# Patient Record
Sex: MALE | Race: WHITE | NOT HISPANIC OR LATINO | Employment: UNEMPLOYED | URBAN - METROPOLITAN AREA
[De-identification: names, ages, dates, MRNs, and addresses within clinical notes are randomized per-mention and may not be internally consistent; named-entity substitution may affect disease eponyms.]

---

## 2017-05-02 ENCOUNTER — HISTORICAL (OUTPATIENT)
Dept: RADIOLOGY | Facility: HOSPITAL | Age: 57
End: 2017-05-02

## 2017-05-23 ENCOUNTER — HISTORICAL (OUTPATIENT)
Dept: ORTHOPEDICS | Facility: CLINIC | Age: 57
End: 2017-05-23

## 2017-09-26 ENCOUNTER — HISTORICAL (OUTPATIENT)
Dept: LAB | Facility: HOSPITAL | Age: 57
End: 2017-09-26

## 2017-09-26 LAB
CHOLEST SERPL-MCNC: 240 MG/DL (ref 50–200)
CHOLEST/HDLC SERPL: 7 {RATIO} (ref 0–5)
HDLC SERPL-MCNC: 36 MG/DL (ref 35–60)
LDLC SERPL CALC-MCNC: 157 MG/DL (ref 50–140)
PSA SERPL-MCNC: 1.15 NG/ML (ref 0–4)
TRIGL SERPL-MCNC: 236 MG/DL (ref 30–150)
VLDLC SERPL CALC-MCNC: 47 MG/DL

## 2018-03-15 ENCOUNTER — HISTORICAL (OUTPATIENT)
Dept: ADMINISTRATIVE | Facility: HOSPITAL | Age: 58
End: 2018-03-15

## 2018-03-15 LAB
ABS NEUT (OLG): 4.27 X10(3)/MCL (ref 2.1–9.2)
ALBUMIN SERPL-MCNC: 4.1 GM/DL (ref 3.4–5)
ALBUMIN/GLOB SERPL: 1 RATIO (ref 1–2)
ALP SERPL-CCNC: 47 UNIT/L (ref 45–117)
ALT SERPL-CCNC: 42 UNIT/L (ref 12–78)
AST SERPL-CCNC: 18 UNIT/L (ref 15–37)
BASOPHILS # BLD AUTO: 0.04 X10(3)/MCL
BASOPHILS NFR BLD AUTO: 0 %
BILIRUB SERPL-MCNC: 0.4 MG/DL (ref 0.2–1)
BILIRUBIN DIRECT+TOT PNL SERPL-MCNC: 0.1 MG/DL
BILIRUBIN DIRECT+TOT PNL SERPL-MCNC: 0.3 MG/DL
BUN SERPL-MCNC: 15 MG/DL (ref 7–18)
CALCIUM SERPL-MCNC: 8.9 MG/DL (ref 8.5–10.1)
CHLORIDE SERPL-SCNC: 104 MMOL/L (ref 98–107)
CO2 SERPL-SCNC: 28 MMOL/L (ref 21–32)
CREAT SERPL-MCNC: 1 MG/DL (ref 0.6–1.3)
EOSINOPHIL # BLD AUTO: 0.15 X10(3)/MCL
EOSINOPHIL NFR BLD AUTO: 2 %
ERYTHROCYTE [DISTWIDTH] IN BLOOD BY AUTOMATED COUNT: 13.8 % (ref 11.5–14.5)
GLOBULIN SER-MCNC: 4.1 GM/ML (ref 2.3–3.5)
GLUCOSE SERPL-MCNC: 115 MG/DL (ref 74–106)
HCT VFR BLD AUTO: 49.6 % (ref 40–51)
HGB BLD-MCNC: 16 GM/DL (ref 13.5–17.5)
IMM GRANULOCYTES # BLD AUTO: 0.04 10*3/UL
IMM GRANULOCYTES NFR BLD AUTO: 0 %
LYMPHOCYTES # BLD AUTO: 2.62 X10(3)/MCL
LYMPHOCYTES NFR BLD AUTO: 33 % (ref 13–40)
MCH RBC QN AUTO: 29.3 PG (ref 26–34)
MCHC RBC AUTO-ENTMCNC: 32.3 GM/DL (ref 31–37)
MCV RBC AUTO: 90.7 FL (ref 80–100)
MONOCYTES # BLD AUTO: 0.74 X10(3)/MCL
MONOCYTES NFR BLD AUTO: 9 % (ref 4–12)
NEUTROPHILS # BLD AUTO: 4.27 X10(3)/MCL
NEUTROPHILS NFR BLD AUTO: 54 X10(3)/MCL
PLATELET # BLD AUTO: 288 X10(3)/MCL (ref 130–400)
PMV BLD AUTO: 9.7 FL (ref 7.4–10.4)
POTASSIUM SERPL-SCNC: 4.7 MMOL/L (ref 3.5–5.1)
PROT SERPL-MCNC: 8.2 GM/DL (ref 6.4–8.2)
RBC # BLD AUTO: 5.47 X10(6)/MCL (ref 4.5–5.9)
SODIUM SERPL-SCNC: 138 MMOL/L (ref 136–145)
WBC # SPEC AUTO: 7.9 X10(3)/MCL (ref 4.5–11)

## 2018-03-22 ENCOUNTER — HISTORICAL (OUTPATIENT)
Dept: SURGERY | Facility: HOSPITAL | Age: 58
End: 2018-03-22

## 2018-03-30 ENCOUNTER — HISTORICAL (OUTPATIENT)
Dept: LAB | Facility: HOSPITAL | Age: 58
End: 2018-03-30

## 2018-03-30 LAB
ALBUMIN SERPL-MCNC: 3.8 GM/DL (ref 3.4–5)
ALBUMIN/GLOB SERPL: 0.8 RATIO (ref 1.1–2)
ALP SERPL-CCNC: 46 UNIT/L (ref 46–116)
ALT SERPL-CCNC: 41 UNIT/L (ref 12–78)
AST SERPL-CCNC: 20 UNIT/L (ref 10–37)
BILIRUB SERPL-MCNC: 0.4 MG/DL (ref 0.2–1)
BILIRUBIN DIRECT+TOT PNL SERPL-MCNC: 0.1 MG/DL (ref 0–0.2)
BILIRUBIN DIRECT+TOT PNL SERPL-MCNC: 0.3 MG/DL
BUN SERPL-MCNC: 13 MG/DL (ref 7–18)
CALCIUM SERPL-MCNC: 9.2 MG/DL (ref 8.5–10.1)
CHLORIDE SERPL-SCNC: 102 MMOL/L (ref 98–107)
CHOLEST SERPL-MCNC: 201 MG/DL (ref 50–200)
CHOLEST/HDLC SERPL: 5 {RATIO} (ref 0–5)
CO2 SERPL-SCNC: 26.7 MMOL/L (ref 21–32)
CREAT SERPL-MCNC: 1.02 MG/DL (ref 0.7–1.3)
GLOBULIN SER-MCNC: 4.5 GM/DL (ref 2.4–3.5)
GLUCOSE SERPL-MCNC: 99 MG/DL (ref 74–106)
HDLC SERPL-MCNC: 43 MG/DL (ref 35–60)
LDLC SERPL CALC-MCNC: 117 MG/DL (ref 50–140)
POTASSIUM SERPL-SCNC: 5 MMOL/L (ref 3.5–5.1)
PROT SERPL-MCNC: 8.3 GM/DL (ref 6.4–8.2)
SODIUM SERPL-SCNC: 138 MMOL/L (ref 136–145)
TRIGL SERPL-MCNC: 205 MG/DL (ref 30–150)
VLDLC SERPL CALC-MCNC: 41 MG/DL

## 2018-07-10 ENCOUNTER — HISTORICAL (OUTPATIENT)
Dept: RADIOLOGY | Facility: HOSPITAL | Age: 58
End: 2018-07-10

## 2018-07-17 ENCOUNTER — HISTORICAL (OUTPATIENT)
Dept: ADMINISTRATIVE | Facility: HOSPITAL | Age: 58
End: 2018-07-17

## 2018-07-17 LAB
APTT PPP: 30.4 SECOND(S) (ref 23.3–37)
BUN SERPL-MCNC: 15 MG/DL (ref 7–18)
CALCIUM SERPL-MCNC: 8.7 MG/DL (ref 8.5–10.1)
CHLORIDE SERPL-SCNC: 107 MMOL/L (ref 98–107)
CO2 SERPL-SCNC: 27 MMOL/L (ref 21–32)
CREAT SERPL-MCNC: 1 MG/DL (ref 0.6–1.3)
CREAT/UREA NIT SERPL: 15
ERYTHROCYTE [DISTWIDTH] IN BLOOD BY AUTOMATED COUNT: 13.7 % (ref 11.5–14.5)
GLUCOSE SERPL-MCNC: 79 MG/DL (ref 74–106)
HCT VFR BLD AUTO: 44.7 % (ref 40–51)
HGB BLD-MCNC: 14.4 GM/DL (ref 13.5–17.5)
INR PPP: 1.06 (ref 0.9–1.2)
MCH RBC QN AUTO: 28.8 PG (ref 26–34)
MCHC RBC AUTO-ENTMCNC: 32.2 GM/DL (ref 31–37)
MCV RBC AUTO: 89.4 FL (ref 80–100)
PLATELET # BLD AUTO: 301 X10(3)/MCL (ref 130–400)
PMV BLD AUTO: 9.4 FL (ref 7.4–10.4)
POTASSIUM SERPL-SCNC: 4.5 MMOL/L (ref 3.5–5.1)
PROTHROMBIN TIME: 13.1 SECOND(S) (ref 11.9–14.4)
RBC # BLD AUTO: 5 X10(6)/MCL (ref 4.5–5.9)
SODIUM SERPL-SCNC: 142 MMOL/L (ref 136–145)
WBC # SPEC AUTO: 9.8 X10(3)/MCL (ref 4.5–11)

## 2018-08-06 ENCOUNTER — HOSPITAL ENCOUNTER (OUTPATIENT)
Dept: MEDSURG UNIT | Facility: HOSPITAL | Age: 58
End: 2018-08-08
Attending: INTERNAL MEDICINE | Admitting: INTERNAL MEDICINE

## 2018-08-06 LAB
APPEARANCE, UA: CLEAR
BACTERIA #/AREA URNS AUTO: ABNORMAL /[HPF]
BILIRUB UR QL STRIP: NEGATIVE
CHOLEST SERPL-MCNC: 204 MG/DL
CHOLEST/HDLC SERPL: 6.8 {RATIO} (ref 0–5)
COLOR UR: NORMAL
EST. AVERAGE GLUCOSE BLD GHB EST-MCNC: 123 MG/DL
GLUCOSE (UA): NORMAL
HBA1C MFR BLD: 5.9 % (ref 4.2–6.3)
HDLC SERPL-MCNC: 30 MG/DL
HGB UR QL STRIP: NEGATIVE
HYALINE CASTS #/AREA URNS LPF: ABNORMAL /[LPF]
KETONES UR QL STRIP: NEGATIVE
LDLC SERPL CALC-MCNC: ABNORMAL MG/DL (ref 0–130)
LEUKOCYTE ESTERASE UR QL STRIP: NEGATIVE
NITRITE UR QL STRIP: NEGATIVE
PH UR STRIP: 5.5 [PH] (ref 4.5–8)
PROT UR QL STRIP: NEGATIVE
RBC #/AREA URNS AUTO: ABNORMAL /[HPF]
SP GR UR STRIP: 1.02 (ref 1–1.03)
SQUAMOUS #/AREA URNS LPF: ABNORMAL /[LPF]
T4 SERPL-MCNC: 9 MCG/DL (ref 4.5–12.1)
TRIGL SERPL-MCNC: 530 MG/DL
TSH SERPL-ACNC: 2.96 MIU/L (ref 0.36–3.74)
UROBILINOGEN UR STRIP-ACNC: NORMAL
VLDLC SERPL CALC-MCNC: ABNORMAL MG/DL
WBC #/AREA URNS AUTO: ABNORMAL /HPF

## 2018-08-07 LAB
ABS NEUT (OLG): 5.07 X10(3)/MCL (ref 2.1–9.2)
ALBUMIN SERPL-MCNC: 3.2 GM/DL (ref 3.4–5)
ALBUMIN/GLOB SERPL: 1 RATIO (ref 1–2)
ALP SERPL-CCNC: 37 UNIT/L (ref 45–117)
ALT SERPL-CCNC: 28 UNIT/L (ref 12–78)
AST SERPL-CCNC: 17 UNIT/L (ref 15–37)
BASOPHILS # BLD AUTO: 0.05 X10(3)/MCL
BASOPHILS NFR BLD AUTO: 0 %
BILIRUB SERPL-MCNC: 0.3 MG/DL (ref 0.2–1)
BILIRUBIN DIRECT+TOT PNL SERPL-MCNC: <0.1 MG/DL
BILIRUBIN DIRECT+TOT PNL SERPL-MCNC: ABNORMAL MG/DL
BUN SERPL-MCNC: 11 MG/DL (ref 7–18)
CALCIUM SERPL-MCNC: 8.4 MG/DL (ref 8.5–10.1)
CHLORIDE SERPL-SCNC: 108 MMOL/L (ref 98–107)
CO2 SERPL-SCNC: 25 MMOL/L (ref 21–32)
CREAT SERPL-MCNC: 0.9 MG/DL (ref 0.6–1.3)
EOSINOPHIL # BLD AUTO: 0.46 10*3/UL
EOSINOPHIL NFR BLD AUTO: 5 %
ERYTHROCYTE [DISTWIDTH] IN BLOOD BY AUTOMATED COUNT: 13.9 % (ref 11.5–14.5)
GLOBULIN SER-MCNC: 3.3 GM/ML (ref 2.3–3.5)
GLUCOSE SERPL-MCNC: 92 MG/DL (ref 74–106)
HCT VFR BLD AUTO: 42.6 % (ref 40–51)
HGB BLD-MCNC: 13.8 GM/DL (ref 13.5–17.5)
IMM GRANULOCYTES # BLD AUTO: 0.03 10*3/UL
IMM GRANULOCYTES NFR BLD AUTO: 0 %
LYMPHOCYTES # BLD AUTO: 3.59 X10(3)/MCL
LYMPHOCYTES NFR BLD AUTO: 36 % (ref 13–40)
MCH RBC QN AUTO: 29.4 PG (ref 26–34)
MCHC RBC AUTO-ENTMCNC: 32.4 GM/DL (ref 31–37)
MCV RBC AUTO: 90.8 FL (ref 80–100)
MONOCYTES # BLD AUTO: 0.76 X10(3)/MCL
MONOCYTES NFR BLD AUTO: 8 % (ref 4–12)
NEUTROPHILS # BLD AUTO: 5.07 X10(3)/MCL
NEUTROPHILS NFR BLD AUTO: 51 X10(3)/MCL
PLATELET # BLD AUTO: 280 X10(3)/MCL (ref 130–400)
PMV BLD AUTO: 9.8 FL (ref 7.4–10.4)
POTASSIUM SERPL-SCNC: 4.3 MMOL/L (ref 3.5–5.1)
PROT SERPL-MCNC: 6.5 GM/DL (ref 6.4–8.2)
RBC # BLD AUTO: 4.69 X10(6)/MCL (ref 4.5–5.9)
SODIUM SERPL-SCNC: 142 MMOL/L (ref 136–145)
WBC # SPEC AUTO: 10 X10(3)/MCL (ref 4.5–11)

## 2018-08-19 ENCOUNTER — HISTORICAL (OUTPATIENT)
Dept: SLEEP MEDICINE | Facility: HOSPITAL | Age: 58
End: 2018-08-19

## 2018-09-07 ENCOUNTER — HISTORICAL (OUTPATIENT)
Dept: SLEEP MEDICINE | Facility: HOSPITAL | Age: 58
End: 2018-09-07

## 2019-01-25 ENCOUNTER — HISTORICAL (OUTPATIENT)
Dept: INTERNAL MEDICINE | Facility: CLINIC | Age: 59
End: 2019-01-25

## 2019-01-25 LAB
ALBUMIN SERPL-MCNC: 4 GM/DL (ref 3.4–5)
ALBUMIN/GLOB SERPL: 0.89 RATIO (ref 1.1–2)
ALP SERPL-CCNC: 54 UNIT/L (ref 45–117)
ALT SERPL-CCNC: 34 UNIT/L (ref 12–78)
AST SERPL-CCNC: 16 UNIT/L (ref 15–37)
BILIRUB SERPL-MCNC: 0.4 MG/DL (ref 0.2–1)
BILIRUBIN DIRECT+TOT PNL SERPL-MCNC: 0.1 MG/DL
BILIRUBIN DIRECT+TOT PNL SERPL-MCNC: 0.3 MG/DL
BUN SERPL-MCNC: 15 MG/DL (ref 7–18)
CALCIUM SERPL-MCNC: 9.1 MG/DL (ref 8.5–10.1)
CHLORIDE SERPL-SCNC: 104 MMOL/L (ref 98–107)
CHOLEST SERPL-MCNC: 161 MG/DL
CHOLEST/HDLC SERPL: 3.4 {RATIO} (ref 0–5)
CO2 SERPL-SCNC: 29 MMOL/L (ref 21–32)
CREAT SERPL-MCNC: 1.1 MG/DL (ref 0.6–1.3)
GLOBULIN SER-MCNC: 4.5 GM/ML (ref 2.3–3.5)
GLUCOSE SERPL-MCNC: 92 MG/DL (ref 74–106)
HDLC SERPL-MCNC: 47 MG/DL
LDLC SERPL CALC-MCNC: 82 MG/DL (ref 0–130)
POTASSIUM SERPL-SCNC: 4.6 MMOL/L (ref 3.5–5.1)
PROT SERPL-MCNC: 8.5 GM/DL (ref 6.4–8.2)
SODIUM SERPL-SCNC: 138 MMOL/L (ref 136–145)
TRIGL SERPL-MCNC: 160 MG/DL
VLDLC SERPL CALC-MCNC: 32 MG/DL

## 2019-02-11 ENCOUNTER — HISTORICAL (OUTPATIENT)
Dept: ADMINISTRATIVE | Facility: HOSPITAL | Age: 59
End: 2019-02-11

## 2020-01-18 ENCOUNTER — HISTORICAL (OUTPATIENT)
Dept: LAB | Facility: HOSPITAL | Age: 60
End: 2020-01-18

## 2020-01-18 LAB
ABS NEUT (OLG): 4.44
ALBUMIN SERPL-MCNC: 3.8 GM/DL (ref 3.2–4.6)
ALBUMIN/GLOB SERPL: 1.1 RATIO (ref 1.1–2)
ALP SERPL-CCNC: 39 UNIT/L (ref 40–150)
ALT SERPL-CCNC: 30 UNIT/L (ref 0–55)
AST SERPL-CCNC: 21 UNIT/L (ref 5–34)
BASOPHILS # BLD AUTO: 0.02 X10(3)/MCL
BASOPHILS NFR BLD AUTO: 0.3 %
BILIRUB SERPL-MCNC: 0.4 MG/DL
BILIRUBIN DIRECT+TOT PNL SERPL-MCNC: 0.2 MG/DL
BILIRUBIN DIRECT+TOT PNL SERPL-MCNC: 0.2 MG/DL (ref 0–0.5)
BUN SERPL-MCNC: 11 MG/DL (ref 8.4–25.7)
CALCIUM SERPL-MCNC: 9.3 MG/DL (ref 8.8–10)
CHLORIDE SERPL-SCNC: 108 MMOL/L (ref 98–107)
CHOLEST SERPL-MCNC: 151 MG/DL
CHOLEST/HDLC SERPL: 4 {RATIO} (ref 0–5)
CO2 SERPL-SCNC: 26 MEQ/L (ref 23–31)
CREAT SERPL-MCNC: 1.03 MG/DL (ref 0.73–1.18)
EOSINOPHIL # BLD AUTO: 0.19 X10(3)/MCL
EOSINOPHIL NFR BLD AUTO: 2.4 %
ERYTHROCYTE [DISTWIDTH] IN BLOOD BY AUTOMATED COUNT: 13 %
GLOBULIN SER-MCNC: 3.5 GM/DL (ref 2.4–3.5)
GLUCOSE SERPL-MCNC: 94 MG/DL (ref 82–115)
HCT VFR BLD AUTO: 45.6 % (ref 39–49)
HDLC SERPL-MCNC: 41 MG/DL
HGB BLD-MCNC: 14.6 GM/DL (ref 12.6–16.6)
IMM GRANULOCYTES # BLD AUTO: 0.02 10*3/UL (ref 0–0.1)
IMM GRANULOCYTES NFR BLD AUTO: 0.3 % (ref 0–1)
LDLC SERPL CALC-MCNC: 85 MG/DL (ref 50–140)
LYMPHOCYTES # BLD AUTO: 2.46 X10(3)/MCL
LYMPHOCYTES NFR BLD AUTO: 31.6 %
MCH RBC QN AUTO: 29.3 PG (ref 27–33)
MCHC RBC AUTO-ENTMCNC: 32 GM/DL (ref 32–35)
MCV RBC AUTO: 91.4 FL (ref 84–97)
MONOCYTES # BLD AUTO: 0.66 X10(3)/MCL
MONOCYTES NFR BLD AUTO: 8.5 %
NEUTROPHILS # BLD AUTO: 4.44 X10(3)/MCL
NEUTROPHILS NFR BLD AUTO: 56.9 %
PLATELET # BLD AUTO: 308 X10(3)/MCL (ref 140–450)
PMV BLD AUTO: 10 FL
POTASSIUM SERPL-SCNC: 4.5 MMOL/L (ref 3.5–5.1)
PROT SERPL-MCNC: 7.3 GM/DL (ref 5.8–7.6)
PSA SERPL-MCNC: 0.86 NG/ML
RBC # BLD AUTO: 4.99 X10(6)/MCL (ref 4.3–5.6)
SODIUM SERPL-SCNC: 142 MMOL/L (ref 136–145)
TRIGL SERPL-MCNC: 123 MG/DL (ref 34–140)
TSH SERPL-ACNC: 3.82 UIU/ML (ref 0.35–4.94)
VLDLC SERPL CALC-MCNC: 25 MG/DL
WBC # SPEC AUTO: 7.79 X10(3)/MCL (ref 3.4–9.2)

## 2020-02-11 ENCOUNTER — HISTORICAL (OUTPATIENT)
Dept: RADIOLOGY | Facility: HOSPITAL | Age: 60
End: 2020-02-11

## 2021-01-18 ENCOUNTER — HISTORICAL (OUTPATIENT)
Dept: LAB | Facility: HOSPITAL | Age: 61
End: 2021-01-18

## 2021-01-18 LAB
ABS NEUT (OLG): 4.67
ALBUMIN SERPL-MCNC: 3.9 GM/DL (ref 3.4–4.8)
ALBUMIN/GLOB SERPL: 1 RATIO (ref 1.1–2)
ALP SERPL-CCNC: 48 UNIT/L (ref 40–150)
ALT SERPL-CCNC: 21 UNIT/L (ref 0–55)
AST SERPL-CCNC: 17 UNIT/L (ref 5–34)
BASOPHILS # BLD AUTO: 0.03 X10(3)/MCL
BASOPHILS NFR BLD AUTO: 0.4 %
BILIRUB SERPL-MCNC: 0.4 MG/DL
BILIRUBIN DIRECT+TOT PNL SERPL-MCNC: 0.1 MG/DL (ref 0–0.5)
BILIRUBIN DIRECT+TOT PNL SERPL-MCNC: 0.3 MG/DL
BUN SERPL-MCNC: 13 MG/DL (ref 8.4–25.7)
CALCIUM SERPL-MCNC: 9.6 MG/DL (ref 8.8–10)
CHLORIDE SERPL-SCNC: 107 MMOL/L (ref 98–107)
CHOLEST SERPL-MCNC: 219 MG/DL
CHOLEST/HDLC SERPL: 5 {RATIO} (ref 0–5)
CO2 SERPL-SCNC: 29 MEQ/L (ref 23–31)
CREAT SERPL-MCNC: 0.89 MG/DL (ref 0.73–1.18)
EOSINOPHIL # BLD AUTO: 0.2 X10(3)/MCL
EOSINOPHIL NFR BLD AUTO: 2.6 %
ERYTHROCYTE [DISTWIDTH] IN BLOOD BY AUTOMATED COUNT: 14 %
GLOBULIN SER-MCNC: 3.9 GM/DL (ref 2.4–3.5)
GLUCOSE SERPL-MCNC: 93 MG/DL (ref 82–115)
HCT VFR BLD AUTO: 45.8 % (ref 39–49)
HDLC SERPL-MCNC: 43 MG/DL (ref 35–60)
HGB BLD-MCNC: 14.7 GM/DL (ref 12.6–16.6)
IMM GRANULOCYTES # BLD AUTO: 0.03 10*3/UL (ref 0–0.1)
IMM GRANULOCYTES NFR BLD AUTO: 0.4 % (ref 0–1)
LDLC SERPL CALC-MCNC: 141 MG/DL (ref 50–140)
LYMPHOCYTES # BLD AUTO: 2.11 X10(3)/MCL
LYMPHOCYTES NFR BLD AUTO: 27.1 %
MCH RBC QN AUTO: 29.3 PG (ref 27–33)
MCHC RBC AUTO-ENTMCNC: 32.1 GM/DL (ref 32–35)
MCV RBC AUTO: 91.2 FL (ref 84–97)
MONOCYTES # BLD AUTO: 0.75 X10(3)/MCL
MONOCYTES NFR BLD AUTO: 9.6 %
NEUTROPHILS # BLD AUTO: 4.67 X10(3)/MCL
NEUTROPHILS NFR BLD AUTO: 59.9 %
PLATELET # BLD AUTO: 320 X10(3)/MCL (ref 140–450)
PMV BLD AUTO: 9 FL
POTASSIUM SERPL-SCNC: 5.4 MMOL/L (ref 3.5–5.1)
PROT SERPL-MCNC: 7.8 GM/DL (ref 5.8–7.6)
PSA SERPL-MCNC: 0.69 NG/ML
RBC # BLD AUTO: 5.02 X10(6)/MCL (ref 4.3–5.6)
SODIUM SERPL-SCNC: 143 MMOL/L (ref 136–145)
TRIGL SERPL-MCNC: 177 MG/DL (ref 34–140)
VLDLC SERPL CALC-MCNC: 35 MG/DL
WBC # SPEC AUTO: 7.79 X10(3)/MCL (ref 3.4–9.2)

## 2021-03-22 ENCOUNTER — HISTORICAL (OUTPATIENT)
Dept: ADMINISTRATIVE | Facility: HOSPITAL | Age: 61
End: 2021-03-22

## 2021-03-22 LAB
BUN SERPL-MCNC: 11.7 MG/DL (ref 8.4–25.7)
CALCIUM SERPL-MCNC: 9 MG/DL (ref 8.8–10)
CHLORIDE SERPL-SCNC: 104 MMOL/L (ref 98–107)
CO2 SERPL-SCNC: 28 MMOL/L (ref 23–31)
CREAT SERPL-MCNC: 0.88 MG/DL (ref 0.73–1.18)
CREAT/UREA NIT SERPL: 13
GLUCOSE SERPL-MCNC: 95 MG/DL (ref 82–115)
POTASSIUM SERPL-SCNC: 4.3 MMOL/L (ref 3.5–5.1)
SODIUM SERPL-SCNC: 139 MMOL/L (ref 136–145)

## 2021-03-29 ENCOUNTER — HISTORICAL (OUTPATIENT)
Dept: LAB | Facility: HOSPITAL | Age: 61
End: 2021-03-29

## 2021-03-29 LAB
ABS NEUT (OLG): 4.27
BASOPHILS # BLD AUTO: 0.02 X10(3)/MCL
BASOPHILS NFR BLD AUTO: 0.3 %
EOSINOPHIL # BLD AUTO: 0.22 X10(3)/MCL
EOSINOPHIL NFR BLD AUTO: 2.8 %
ERYTHROCYTE [DISTWIDTH] IN BLOOD BY AUTOMATED COUNT: 14 %
HCT VFR BLD AUTO: 45.8 % (ref 39–49)
HGB BLD-MCNC: 14.9 GM/DL (ref 12.6–16.6)
IMM GRANULOCYTES # BLD AUTO: 0.02 10*3/UL (ref 0–0.1)
IMM GRANULOCYTES NFR BLD AUTO: 0.3 % (ref 0–1)
LYMPHOCYTES # BLD AUTO: 2.62 X10(3)/MCL
LYMPHOCYTES NFR BLD AUTO: 33.5 %
MCH RBC QN AUTO: 29.7 PG (ref 27–33)
MCHC RBC AUTO-ENTMCNC: 32.5 GM/DL (ref 32–35)
MCV RBC AUTO: 91.4 FL (ref 84–97)
MONOCYTES # BLD AUTO: 0.66 X10(3)/MCL
MONOCYTES NFR BLD AUTO: 8.5 %
NEUTROPHILS # BLD AUTO: 4.27 X10(3)/MCL
NEUTROPHILS NFR BLD AUTO: 54.6 %
PLATELET # BLD AUTO: 346 X10(3)/MCL (ref 140–450)
PMV BLD AUTO: 9 FL
RBC # BLD AUTO: 5.01 X10(6)/MCL (ref 4.3–5.6)
WBC # SPEC AUTO: 7.81 X10(3)/MCL (ref 3.4–9.2)

## 2021-04-19 ENCOUNTER — HISTORICAL (OUTPATIENT)
Dept: LAB | Facility: HOSPITAL | Age: 61
End: 2021-04-19

## 2021-04-19 LAB
ALBUMIN SERPL-MCNC: 4 GM/DL (ref 3.4–4.8)
ALBUMIN/GLOB SERPL: 1.1 RATIO (ref 1.1–2)
ALP SERPL-CCNC: 49 UNIT/L (ref 40–150)
ALT SERPL-CCNC: 26 UNIT/L (ref 0–55)
AST SERPL-CCNC: 19 UNIT/L (ref 5–34)
BILIRUB SERPL-MCNC: 0.5 MG/DL
BILIRUBIN DIRECT+TOT PNL SERPL-MCNC: 0.2 MG/DL (ref 0–0.5)
BILIRUBIN DIRECT+TOT PNL SERPL-MCNC: 0.3 MG/DL
BUN SERPL-MCNC: 14 MG/DL (ref 8.4–25.7)
CALCIUM SERPL-MCNC: 9.6 MG/DL (ref 8.8–10)
CHLORIDE SERPL-SCNC: 107 MMOL/L (ref 98–107)
CHOLEST SERPL-MCNC: 194 MG/DL
CHOLEST/HDLC SERPL: 4 {RATIO} (ref 0–5)
CO2 SERPL-SCNC: 29 MEQ/L (ref 23–31)
CREAT SERPL-MCNC: 1.04 MG/DL (ref 0.73–1.18)
DEPRECATED CALCIDIOL+CALCIFEROL SERPL-MC: 39 NG/ML (ref 30–80)
GLOBULIN SER-MCNC: 3.7 GM/DL (ref 2.4–3.5)
GLUCOSE SERPL-MCNC: 95 MG/DL (ref 82–115)
HDLC SERPL-MCNC: 44 MG/DL (ref 35–60)
LDLC SERPL CALC-MCNC: 102 MG/DL (ref 50–140)
POTASSIUM SERPL-SCNC: 5.5 MMOL/L (ref 3.5–5.1)
PROT SERPL-MCNC: 7.7 GM/DL (ref 5.8–7.6)
SODIUM SERPL-SCNC: 143 MMOL/L (ref 136–145)
TRIGL SERPL-MCNC: 239 MG/DL (ref 34–140)
VLDLC SERPL CALC-MCNC: 48 MG/DL

## 2022-03-09 ENCOUNTER — HISTORICAL (OUTPATIENT)
Dept: LAB | Facility: HOSPITAL | Age: 62
End: 2022-03-09

## 2022-03-09 LAB
ALBUMIN SERPL-MCNC: 3.8 G/DL (ref 3.4–4.8)
ALBUMIN/GLOB SERPL: 1.2 {RATIO} (ref 1.1–2)
ALP SERPL-CCNC: 48 U/L (ref 40–150)
ALT SERPL-CCNC: 17 U/L (ref 0–55)
AST SERPL-CCNC: 18 U/L (ref 5–34)
BILIRUB SERPL-MCNC: 0.3 MG/DL
BILIRUBIN DIRECT+TOT PNL SERPL-MCNC: 0.1 (ref 0–0.5)
BILIRUBIN DIRECT+TOT PNL SERPL-MCNC: 0.2
BUN SERPL-MCNC: 10 MG/DL (ref 8.4–25.7)
CALCIUM SERPL-MCNC: 8.9 MG/DL (ref 8.7–10.5)
CHLORIDE SERPL-SCNC: 108 MMOL/L (ref 98–107)
CHOLEST SERPL-MCNC: 250 MG/DL
CHOLEST/HDLC SERPL: 6 {RATIO} (ref 0–5)
CO2 SERPL-SCNC: 27 MMOL/L (ref 23–31)
CREAT SERPL-MCNC: 0.89 MG/DL (ref 0.73–1.18)
GLOBULIN SER-MCNC: 3.3 G/DL (ref 2.4–3.5)
GLUCOSE SERPL-MCNC: 88 MG/DL (ref 82–115)
HDLC SERPL-MCNC: 40 MG/DL (ref 35–60)
HEMOLYSIS INTERF INDEX SERPL-ACNC: 3
ICTERIC INTERF INDEX SERPL-ACNC: 0
LDLC SERPL CALC-MCNC: 167 MG/DL (ref 50–140)
LIPEMIC INTERF INDEX SERPL-ACNC: 4
POTASSIUM SERPL-SCNC: 4.2 MMOL/L (ref 3.5–5.1)
PROT SERPL-MCNC: 7.1 G/DL (ref 5.8–7.6)
SODIUM SERPL-SCNC: 142 MMOL/L (ref 136–145)
TRIGL SERPL-MCNC: 216 MG/DL (ref 34–140)
VLDLC SERPL CALC-MCNC: 43 MG/DL

## 2022-04-11 ENCOUNTER — HISTORICAL (OUTPATIENT)
Dept: ADMINISTRATIVE | Facility: HOSPITAL | Age: 62
End: 2022-04-11

## 2022-04-28 VITALS
BODY MASS INDEX: 32.64 KG/M2 | SYSTOLIC BLOOD PRESSURE: 135 MMHG | WEIGHT: 215.38 LBS | OXYGEN SATURATION: 98 % | HEIGHT: 68 IN | DIASTOLIC BLOOD PRESSURE: 84 MMHG

## 2022-04-30 NOTE — H&P
Patient:   Abran Vasquez             MRN: [id number]            FIN: [id number]               Age:   58 years     Sex:  Male     :  1960   Associated Diagnoses:   None   Author:   Bruce Obrien MD      Basic Information   Source of history:  Self.       Chief Complaint   A Fib RVR; no chest pain or SOB      History of Present Illness   Patient is a 58-year-old male with a past medical history HLD, tobacco abuse, mild pulmonary hypertension, and recently diagnosed A. fib and started on Cardizem 240 mg by mouth.  Patient initially presented with dizziness and blurry vision at time of diagnosis of A. fib 6/3/18, but has had no episodes since.  He denied any presyncope/syncopal events, but had fallen on recently repaired right shoulder. Echo on 18 showed LVEF of 55% and mild pulmonary hypertension with PAP approximately 24 mm Hg.  Patient today was receiving coronary angiography for a positive nuclear stress testing for reversible ischemia.  He was found to have triple-vessel coronary disease on angiogram.  During procedure he was found to be in atrial fibrillation with RVR with a heart rate in the 140s and was asymptomatic/hemodynamically stable.  He was given IV digoxin to 50 mcg and IV metoprolol 5 mg in addition to previously taking his morning Cardizem dose by mouth 240 mg.  Heart rate remained in the 120s to 140s with no symptoms and was consult by cardiology to admit patient.  He denies headache, vision changes, chest pain, shortness of breath, N/V/D, urinary symptoms, and numbness and tingling in extremities.           Review of Systems   Constitutional:  No fever, No chills, No sweats, No weakness, No fatigue, No decreased activity.    Eye:  a single episode of blurring vision, No double vision.    Ear/Nose/Mouth/Throat:  No decreased hearing, No ear pain, No nasal congestion, No sore throat.    Respiratory:  No shortness of breath, No cough, No sputum production, No wheezing.     Cardiovascular:  Tachycardia, No chest pain, No palpitations, No peripheral edema, No syncope.    Gastrointestinal:  No nausea, No vomiting, No diarrhea, No constipation.    Genitourinary:  No dysuria, No hematuria, No change in urine stream.    Endocrine:  No excessive thirst, No polyuria, No cold intolerance, No heat intolerance, No excessive hunger.    Musculoskeletal:  Decreased range of motion, Bilateral shoulder pain.    Integumentary:  No other significant skin complaints, No rash, No pruritus, No breakdown.    Neurologic:  Alert and oriented X4, No abnormal balance, No confusion, No numbness, No tingling, No headache.    Psychiatric:  No anxiety, No depression.       Health Status   Allergies:    Allergic Reactions (Selected)  No Known Medication Allergies   Current medications:    Medications (18) Active  Scheduled: (7)  aspirin 81 mg EC Tab  81 mg 1 tab(s), Oral, Daily  bacit/neomy/polym B(NEOSPORIN PCKT) top 400 u-3.5 mg-5000 u/gm Oin UD  1 michelle, TOP, Once  DIAzepam 5 mg Tab UD  5 mg 1 tab(s), Oral, On Call  diphenhydrAMINE 25 mg Cap UD  25 mg 1 cap(s), Oral, On Call  enoxaparin 100mg/ml Inj  100 mg 1 mL, Subcutaneous, BID  metoprolol tart. 25 mg Tab UD  25 mg 1 tab(s), Oral, BID  rosuvastatin 10 mg Tab UD  40 mg 4 tab(s), Oral, Once a day (at bedtime)  Continuous: (5)  amiodarone 360 mg  360 mg 200 mL, IV, 33.3 mL/hr  amiodarone 540 mg  540 mg 300 mL, IV, 16.6 mL/hr  sodium chloride 0.9% 1,000 mL  1,000 mL, IV, 100 mL/hr  sodium chloride 0.9% 500 mL  500 mL, IV, 75 mL/hr  sodium chloride 0.9% 500 mL  500 mL, IV, 75 mL/hr  PRN: (6)  acetaminophen 325 mg Tab  650 mg 2 tab(s), Oral, q6hr  amlodipine 5 mg Tab UD  10 mg 2 tab(s), Oral, Daily  hydrocodone 7.5mg/APAP 325mg  1 tab(s), Oral, q4hr  metoprolol 1 mg/ml Inj-5 ml  5 mg 5 mL, IV, Once-Unscheduled  Nitroglycerin 0.4 mg TAB (per each)  0.4 mg 1 tab(s), SL, q15min  ondansetron 2 mg/mL inj - 2mL  4 mg 2 mL, IV Push, q4hr     Problem list:    Active  Problems (11)  Acromioclavicular arthrosis   Cervical radiculopathy   HLD (hyperlipidemia)   Impingement syndrome, shoulder   Obesity   Rotator cuff tear   Smoker   Tobacco abuse   Tobacco user   Tobacco user   Traumatic tear of right rotator cuff         Histories   Past Medical History:    Active  HLD (hyperlipidemia) ([id number])  Tobacco abuse ([id number])   Family History:    Father  Cancer  Mother  Dementia  Alzheimer's disease     Procedure history:    Arthroscopy Shoulder (Right) on 3/22/2018 at 57 Years.  Comments:  3/22/2018 13:Huy - Isabelle Sanchez RN  auto-populated from documented surgical case  Rotator Cuff Repair (Right) on 3/22/2018 at 57 Years.  Comments:  3/22/2018 13:Isabelle Fisher RN  auto-populated from documented surgical case  back surgery.  pelvic and hip surgery.  neck surgery.  Tonsillectomy and adenoidectomy; age 10 or over (70811).  Appendectomy; (02141).  exploratory lap.   Social History        Social & Psychosocial Habits    Alcohol  04/05/2016  Use: Past    Frequency: 1-2 times per month    03/15/2018  Use: Current    Comment: occasionally - 03/15/2018 08:43 - Clhoe GALINDO, Carey Knowles    Employment/School  04/05/2016  Status: Unemployed    Highest education: High school    Comment: GED - 04/05/2016 09:59 - Day Luna CHURCH    Exercise  04/05/2016  Times per week: 3-4 times/week    Exercise type: Walking    Home/Environment  04/05/2016  Lives with: Alone    Living situation: Home/Independent    Alcohol abuse in household: No    Substance abuse in household: No    Smoker in household: Yes    Feels unsafe at home: No    Safe place to go: Yes    Family/Friends available to help: Yes    Nutrition/Health  04/05/2016  Type of diet: Regular    Wants to lose weight: Yes    Sleeping concerns: Yes    Feels highly stressed: Yes    Substance Abuse  12/20/2015  Use: Never    03/15/2018  Use: Never    Tobacco  03/15/2018  Use: Current every day smoker     Type: Cigarettes    Comment: 2 cigarettes per day - 03/15/2018 08:43 - Chloe GALINDO, Caery Barbara Knowles    04/04/2018  Use: Current some day smoker    Type: Cigarettes    Tobacco use per day: 2    Started at age: 14 Years    Previous treatment: None    Ready to change: No    Concerns about tobacco use in household: No    Comment: quit over a year - 07/01/2016 08:12 - Day GODWINNLuna  .        Physical Examination   General:  Alert and oriented, No acute distress.    Eye:  Pupils are equal, round and reactive to light, Extraocular movements are intact, No current blurring today.    HENT:  Normocephalic, Normal hearing, Oral mucosa is moist, No pharyngeal erythema.    Neck:  Supple, Non-tender, No carotid bruit, No jugular venous distention, No lymphadenopathy, No thyromegaly.    Respiratory:  Lungs are clear to auscultation, Respirations are non-labored, Breath sounds are equal, Symmetrical chest wall expansion.    Cardiovascular:  No murmur, No gallop, Good pulses equal in all extremities, Normal peripheral perfusion, No edema, Tachycardic; Afib with RVR.    Gastrointestinal:  Soft, Non-tender, Non-distended, Normal bowel sounds.    Genitourinary:  No costovertebral angle tenderness, No urethral discharge.    Musculoskeletal:  Normal strength, No tenderness, No swelling, No deformity, Normal gait, Recent right shoulder surgery: Holding arm in adducted position/painful to move.    Integumentary:  Warm, Dry, Pink, Intact.    Neurologic:  Alert, Oriented, Normal sensory, Normal motor function, No focal deficits, Cranial Nerves II-XII are grossly intact.    Cognition and Speech:  Oriented, Speech clear and coherent, Functional cognition intact.    Psychiatric:  Cooperative, Appropriate mood & affect, Normal judgment.       Review / Management   Laboratory Results   Today's Lab Results : PowerNote Discrete Results   8/6/2018 15:00 CDT       EAG                       123 mg/dL  HI                             Hgb A1c                    5.9 %                             Chol                      204 mg/dL  HI                             HDL                       30 mg/dL  LOW                             Trig                      530 mg/dL  HI                             LDL                       See Comment. mg/dL                             Chol/HDL                  6.8  HI                             VLDL                      See Comment. mg/dL                             T4                        9.00 mcg/dL                             TSH                       2.960 mIU/L        Diagnostic Findings:  Echocardiogram 6/4/2018  Normal left and right ventricular size and function.  The LVEF is 55%.  Mild pulmonary hypertension with PA P approximately 24 mm Hg      Nuclear med stress echocardiogram 7/10/2018  ECG portion of stress test is nondiagnostic due to abnormal baseline EKG.  Nuclear imaging impression: Abnormal.  Moderate size reversible lateral defect of moderate intensity and moderate size inferolateral partially reversible inferolateral defect of moderate intensity.  Mary Jane-infarct ischemia attenuation.  Mildly reduced LVEF of 43%.  Possible transient ischemic dilatation.  Recommend cardiac cath.    [facility] 8/6/2018  Left main coronary artery with distal 50% stenosis.  Left anterior descending artery with tubular calcified lesion and proximal subsection of approximately 90% stenosis and 90% mid vessel stenosis.  Right coronary artery with 50% proximal stenosis and discrete lesion admit subsection of approximately 75% stenosis.  Posterior descending artery lesion and ostium proximal subsection approximately 75% stenosis.  Elevation of left ventricular end-diastolic pressure consistent with diagnoses of ischemic heart disease.      .       Impression and Plan   1.  Paroxysmal atrial fibrillation with RVR  A. fib with RVR confirmed on current EKG  Admitted to telemetry floor  Started on amiodarone drip to be titrated  Started on  metoprolol 25 mg twice a day for rate control  Aspirin 81 mg  Full dose Lovenox 100 mg twice a day  Atorvastatin 40 mg at night  Cardiology consult to follow    2.  Multivessel CAD: Stable  Nuclear stress echo read as abnormal: See above report  Left main coronary artery stenosis, left anterior descending artery stenosis, right coronary artery stenosis, posterior descending artery stenosis  No acute EKG finding other than stated above  No troponins ordered at this time; recent cath will have elevated levels  Aspirin 81 mg  Atorvastatin 40 mg at night  Low threshold for cardio/pulmonary distress; any signs of chest pain, shortness of breath, palpitations will get EKG and troponins.  Referral to [facility] for elective CABG evaluation    3.  HLD  Home dose atorvastatin 20 mg daily  High-intensity statin of atorvastatin 40 mg daily recommended by cardiology      Prophylaxis: Lovenox full dose    Disposition: Stable multivessel coronary artery disease with low threshold for cardio/pulmonary distress; continue to monitor.  If any cardio/pulmonary symptoms arise order EKG and troponins.  Troponins may be artificially elevated due to recent left heart cath.  A. fib with RVR being given amiodarone and metoprolol will continue to monitor rate and rhythm.

## 2022-04-30 NOTE — CONSULTS
Patient:   Abran Vasquez             MRN: [id number]            FIN: [id number]               Age:   58 years     Sex:  Male     :  1960   Associated Diagnoses:   None   Author:   Randall Pinedo MD      A 59 y/o male patietn with recently diagnosed paroxysmal atrial fibrillation presenting today for out paitent coronary angiography after a positive nuclear stress testing for reversible ischemia. Mr Vasquez was found to have triple vessel coronary disease on the angiogram. But while in the procedure he was noted to go into atrial fibrillation with rvr and HR in the 140s. He remained completely asymptomatic and hemodynamically stable. He was transferred to recovery and given IV digoxin 250mcg and IV Metoprolol 5 mg and also took his morining dose of cardizem po 120 mg . Heart rate still remained in the 120s to 140s, therefore to be admitted for controlling of afib with rvr.     P/E:   vs: HR 120s-140s, BP 100s/70s,   No JVD,  clear lungs with good air entry bilaterally  S1S2 well heard, no murmur. Irregulary irregular.   Soft abdomen  No edema,   2+ peripheral pulses.  A & Ox3.     Assessement:   # Atrial fibraillation with rvr - rosario vas at least 1 with CAD.   # Triple vessel stable coronary disease.     Plan:   Admission to [facility] for the afib rvr. will start on Lovenox 1mg/kg bid and amiodarone drip.   switch to metoprolol from cardizem.   c/w asprin. start high intensity statin.   will refer to [facility] for elective cabg evaluation.

## 2022-05-05 NOTE — HISTORICAL [facility] CERNER
This is a historical note converted from Ángela. Formatting and pictures may have been removed.  Please reference Ángela for original formatting and attached multimedia. Orthopedic H&P  ?  Chief complaint:  Follow-up bilateral shoulder pain. ?Right rotator cuff tear  ?  HPI:  This 57-year-old male with bilateral shoulder pain here for follow-up. ?He recently had a C4-5 ACDF done in [place] by Dr. Dutton in September 2017. ?He reports his neck and radicular pain resolved since his surgery. ?He still continues to have bilateral shoulder pain with right being worse than the left. ?Pain is with overhead activity and increase activity. ?He has had injections of the right shoulder without relief. ?At last visit I prescribed physical therapy but is unable to find any. ?He did do the AAOS?shoulder conditioning?exercise program I provided to him. ?He would like surgery, he has had no real changes since last visit. ?At this point had no relief with injections, activity modification, anti-inflammatory medications and rotator cuff strength program. ?He says his shoulder pain is keeping him from working.  ?  Review of systems:  Negative aside from HPI  ?  Past medical history  AC arthritis  Cervical radiculopathy  Hyperlipidemia  Right shoulder impingement syndrome  Obesity  Smoker  Tobacco user  ?  Past surgical history:  Appendectomy  Back surgery  C4-5 ACDF  Pelvic and hip surgery  Tonsillectomy and adenoidectomy  ?  Medications:  Atorvastatin  Indomethacin  Naproxen  Tramadol  ?  Allergies:  No known drug allergies  ?  Social history:  Social alcohol:  He is unemployed  He lives alone  Denies illicit drug abuse  Current everyday smoker  ?  Family history:  Alzheimers disease in his mother  ?  Physical exam:  Vital signs have been reviewed and are within normal limits  General: he is awake alert in no acute distress  Respiratory: Unlabored on room air  Cardiovascular: Regular rate and rhythm by palpable radial  pulse  Cervical spine: Full C-spine range of motion without pain, negative Spurlings. ?Neurovascular intact in the right upper extremity from C4-T1 with 5 out of 5 strength throughout. ?He has well-healed anterior surgical scar  ?  Right upper extremity shoulder:  Mild tenderness palpation at the AC joint and the biceps groove  Positive Speeds, positive Yergason, positive cross body adduction, positive Neer  4-5 strength with forward flexion. ?5 out of 5 with internal rotation and external rotation. ?He has pain with rotator cuff testing  Active range of motion limited by pain, passive range of motion is full  Active range of motion is 110? of forward flexion and abduction with 30? of external rotation. ?External rotation is equal to contralateral side  5/?5 strength with AIN PIN and ulnar nerve  Sensations intact light touch with median radial and ulnar nerve distributions  2+ palpable radial pulse  ?  Imaging:  MRI has been reviewed he has a full-thickness supraspinous tear and partial infraspinatus tear. ?He has labral tearing and biceps tendinosis with edema in the bicipital groove  ?  Assessment and plan:  57-year-old male with right shoulder impingement, right rotator cuff tear, and a smoker  ?  We discussed risks, benefits, alternatives operative and nonoperative management. ?He is failed nonoperative treatment thus far including injections, activity modification, NSAIDs, and a rotator cuff conditioning program. ?He will like to proceed with a right shoulder arthroscopic cuff repair. ?I also consented for labral debridement, DCE, NSAID, and biceps tenotomy versus tenodesis. ?Written informed consent was obtained from the patient.  ?  Activity as tolerated until surgery date. ?All questions been answered to patients satisfaction. ?He understands his inability to get formal PT postoperatively can make his rehab difficult and he voiced understanding. ?I recommend he stop smoking. ?He only smokes 1-2 cigarettes  daily with his morning coffee but understands this could put him in his increased risk for wound healing complications and rotator cuff not healing.   Sma?medical history, pre-op exam findings right shoulder, diagnosis, and treatment outlined by?Dr. Roque?has been reviewed.? Operative treatment plan is determined to be reasonable and appropriate.?I was present during the evaluation.   I have seen and examined the patient. no changes from above. R shoulder arthroscopy with cuff repair, SAD and possible DCE.  ?  MD Sam Andrade?medical history, pre-op exam findings, diagnosis, and treatment outlined by?Dr. Roque?has been reviewed.? Treatment plan is determined to be reasonable and appropriate.?I was present during the evaluation.

## 2022-05-05 NOTE — HISTORICAL [facility] CERNER
This is a historical note converted from Cervictor manuel. Formatting and pictures may have been removed.  Please reference Ángela for original formatting and attached multimedia. Admission Information  Admitting Physician:?Juan Jose Henson III, MD  Attending Physician:?Yoana Delacruz MD  Consulting Physcian:?Juan Jose Henson III, MD  Primary Care Physician:?YASHIRA Davis  Admitting Diagnosis:?Triple vessel disease, CAD, Hyperlipidemia, atrial fibrillation  Discharge Condition:?Stable  Hospital Course  This is a 58-year-old WM with past medical history of hyperlipidemia, multiple MSK surgeries and tobacco use who is admitted to telemetry after having a diagnostic LHC on 8/6/2018 and was later found to be an atrial fibrillation with RVR (HR in 140s) refractory to digoxin and eventually placed on amiodarone drip postoperatively.  ?   Patient states that he had an acute episode of dizziness with visual changes approximately 2 weeks ago-was diagnosed with atrial fibrillation and rate controlled with Cardizem. ?Patient was not started on any anticoagulation as his [id number] score was 0. ?He followed up with cardiology where he had an echocardiogram followed by NM stress echocardiogram which was abnormal (showed reduced LVEF of 44%). ?He came in on 8/6/2018?for a diagnostic LHC which revealed:?  Left main coronary artery with distal 50% stenosis.  Left anterior descending artery with tubular calcified lesion and proximal subsection of approximately 90% stenosis and 90% mid vessel stenosis.  Right coronary artery with 50% proximal stenosis and discrete lesion admit subsection of approximately 75% stenosis. Posterior descending artery lesion and ostium proximal subsection approximately 75% stenosis.  ?  Patient with triple vessel coronary artery disease-stable. ?Completely asymptomatic from a cardiopulmonary review of systems. ?Denies any angina type pain episodes in the past. ?Functional in his ADLs-denies orthopnea, PND, peripheral edema,  shortness of breath at exertion or rest.??  TSH wnl; A1C <6.5%. CXR done with no acute cardiopulmonary process.  Amiodarone drip was stopped on night of 8/6 and patient was started on oral amiodarone 200mg along with oral metoprolol 25mg BID on 8/7; patient rate controlled now in sinus rhythm.?  ?  Patient afebrile throughout hospital stay; no acute events; VSS, HDS.??Ambulating with ease. Tolerating p.o intake without issues. No acute overnight events.  Medications reconciled and sent to his pharmacy.  ?  Accepted by Dr Meneses at [facility] for further?cardiology interventions.???  Physical Exam  Vitals & Measurements  T:?36.6? ?C (Oral)? TMIN:?36.5? ?C (Oral)? TMAX:?37.0? ?C (Oral)? HR:?62(Peripheral)? HR:?53(Monitored)? RR:?18? BP:?110/69? SpO2:?97%? WT:?98.4?kg?  Gen: NAD, obese  HEENT: NCAT, EOMI, PERRL, moist oral mucosa, no JVD  Resp: CTAB. Normal inspiratory effort. No cough. No crackles, wheezing.  CVS: S1, S2, regular. no murmur. 2+ pedal pulses, no edema.  Abd: NT, ND, soft, no masses. BS+  MSK: no deformity; limited overhead motion of RUE  Skin: no rash, no skin changes  Neuro: AAOx3, no focal deficits. Strength 5/5 b/l; sensation intact  ?  Discharge Plan  Discharge to [facility] for interventional cardiology services. Patient accepted by Dr Meneses.  Discharge Disposition  Medications:?  Amiodorone 200mg daily  Metoprolol tartrate 25mg BID  Continue ASA 81mg, high intensity statin therapy  Continue all previously prescribed medications.?  All other medications reconciled and explained to patient.?  ???  Counselled on lifestyle modification, smoking cessation, abstinence from alcohol, polysubstance abuse.?  Patient understands diagnosis, treatment plan. Clinically stable for discharge?to [facility] for?further cardiology interventions.  ?  ?  Total time spent on discharge >35 mins.?  ?????  ?????   No chest discomfort or arrhythmias overnight.? Transfer to  today in progress.

## 2022-05-05 NOTE — HISTORICAL [facility]
This is a historical note converted from Ángela. Formatting and pictures may have been removed.  Please reference Ángela for original formatting and attached multimedia. Operative report  ?  Date of surgery: 3/22/2018  ?  PreOperative Diagnosis:  1. ?Right rotator cuff tear  2. ?Right shoulder impingement  ?  Post Operative diagnosis: Same  ?  Procedures:  1. ?Right shoulder limited arthroscopic debridement  2. ?Right shoulder arthroscopic rotator cuff repair  3. ?Right shoulder arthroscopic biceps tenotomy  ?  Attending surgeon:  Gerson Girard MD  ?  Surgeon:  Gerson Maria MD  ?  Resident Surgeon:  MD Mahesh Andrade MD  ?  Anesthesia: General  ?  Estimated blood loss: 5 cc  ?  Complications: None  ?  Findings:  Patient had a full-thickness supraspinatus tear without retraction. ?He also had a SLAP tear with?tearing at the biceps anchor.  ?  Implants:  Arthrex 5.5 mm swivel lock suture anchor ?1  ?  Indications:  Mr. Vasquez is a 57-year-old gentleman with right shoulder pain. ?He has an MRI which proved a full-thickness rotator cuff tear at the supraspinatus insertion. ?He is failed nonoperative management including injections, activity modification, anti-inflammatory medications, and physical therapy. ?He was explained risks, benefits, alternatives operative and nonoperative management. ?Patient like to proceed with operative intervention in the form of right shoulder arthroscopy with rotator cuff repair. ?Written informed consent was obtained from the patient.  ?  Procedure detail:  The correct patient identify the preoperative holding area. ?The operative site was marked. ?Patient was taken the operating room and placed supine the operating room table. ?General endotracheal anesthesia was induced. ?Patient was placed into the lateral decubitus position on a beanbag with the operative shoulder up. ?Right shoulder girdle was prepped and draped in usual sterile fashion. ?IV antibiotics was  given to the patient. ?A proper timeout was called.  ?  After timeout the bony landmarks of the right shoulder were drawn on the skin. ?I made a standard posterior lateral arthroscopic portal inserting the trocar into the glenohumeral joint. ?The arthroscope was inserted and a diagnostic arthroscopy was performed. ?He had a SLAP tear with tearing of the biceps anchor. ?He also had full-thickness tear of the supraspinatus tendon. ?Next under direct visualization an anterior lateral arthroscopic portal was made and a passport cannula was inserted. ?The anterior frayed labrum was debrided with a shaver. ?I then performed a biceps tenotomy with arthroscopic scissors and shaved down the biceps anchor to a smooth rim. ?I then debrided the undersurface of the rotator cuff tear at the articular margin.  Inserted the scope in the subacromial space. ?I cleaned out the subacromial space and performed a bursectomy. ?He did not have any large osteophytes or spurring on the undersurface of the acromion so a decompression was not performed.??The rotator cuff tear was identified and debrided. ?Standard lateral portal was made under direct visualization. ?Using a shaver and a renetta, the footprint of the rotator cuff was prepared down to bleeding bone. ?Using a scorpion suture passer, ?FiberWire stitch was placed into the rotator cuff in horizontal mattress fashion. ?Next using scorpion suture passer, I placed a side-to-side stitch in the most posterior aspect of the tear and tied this down arthroscopically. ?This reduced the tear nicely to the posterior?stable rim of the tear.??Next, a?5.5 mm Arthrex swivel lock knotless suture anchor was placed, which?reduced the rotator cuff very well to its bed. ?Final?arthroscopic?photos were taken. ?The arm was taken through range of motion there is no gapping at the rotator cuff repair site. ?The arthroscope was removed and the arthroscopic portals were closed with nylon suture. ?Sterile  dressings were applied and an abduction sling was placed. ?Patient was awakened from anesthesia, extubated, taken recovery room in stable condition tolerating the procedure well.  ?  Postoperative plan:  He will be nonweightbearing to the right upper extremity. ?We will keep him in a sling for 4-6 weeks. ?We will see him in 2 weeks for recheck or will begin gentle passive range of motion exercises. ?We will plan to start physical therapy around 6 weeks after surgery.  His intra op photos were uploaded to the .  ?  ?   Dr. Girard and I?were present with??Jude?during all critical and key portions of the procedure.

## 2022-05-25 ENCOUNTER — LAB VISIT (OUTPATIENT)
Dept: LAB | Facility: HOSPITAL | Age: 62
End: 2022-05-25
Attending: STUDENT IN AN ORGANIZED HEALTH CARE EDUCATION/TRAINING PROGRAM
Payer: MEDICAID

## 2022-05-25 DIAGNOSIS — I10 HYPERTENSION, UNSPECIFIED TYPE: ICD-10-CM

## 2022-05-25 DIAGNOSIS — Z79.899 ON AMIODARONE THERAPY: ICD-10-CM

## 2022-05-25 DIAGNOSIS — I25.10 CORONARY ARTERY DISEASE, UNSPECIFIED VESSEL OR LESION TYPE, UNSPECIFIED WHETHER ANGINA PRESENT, UNSPECIFIED WHETHER NATIVE OR TRANSPLANTED HEART: Primary | ICD-10-CM

## 2022-05-25 LAB
CHOLEST SERPL-MCNC: 203 MG/DL
CHOLEST/HDLC SERPL: 5 {RATIO} (ref 0–5)
HDLC SERPL-MCNC: 44 MG/DL (ref 35–60)
LDLC SERPL CALC-MCNC: 127 MG/DL (ref 50–140)
T4 SERPL-MCNC: 7.41 UG/DL (ref 4.87–11.72)
TRIGL SERPL-MCNC: 159 MG/DL (ref 34–140)
TSH SERPL-ACNC: 4.87 UIU/ML (ref 0.35–4.94)
VLDLC SERPL CALC-MCNC: 32 MG/DL

## 2022-05-25 PROCEDURE — 84436 ASSAY OF TOTAL THYROXINE: CPT

## 2022-05-25 PROCEDURE — 36415 COLL VENOUS BLD VENIPUNCTURE: CPT

## 2022-05-25 PROCEDURE — 84443 ASSAY THYROID STIM HORMONE: CPT

## 2022-05-25 PROCEDURE — 80061 LIPID PANEL: CPT

## 2022-06-03 RX ORDER — AMIODARONE HYDROCHLORIDE 200 MG/1
200 TABLET ORAL DAILY
Qty: 30 TABLET | Refills: 6 | Status: SHIPPED | OUTPATIENT
Start: 2022-06-03

## 2022-06-03 RX ORDER — AMIODARONE HYDROCHLORIDE 200 MG/1
200 TABLET ORAL DAILY
COMMUNITY
Start: 2022-05-10 | End: 2022-06-03 | Stop reason: SDUPTHER

## 2022-06-08 ENCOUNTER — LAB VISIT (OUTPATIENT)
Dept: LAB | Facility: HOSPITAL | Age: 62
End: 2022-06-08
Attending: NURSE PRACTITIONER
Payer: MEDICAID

## 2022-06-08 DIAGNOSIS — I48.91 ATRIAL FIBRILLATION: ICD-10-CM

## 2022-06-08 DIAGNOSIS — M25.552 HIP PAIN, LEFT: ICD-10-CM

## 2022-06-08 DIAGNOSIS — I25.10 DISEASE OF CARDIOVASCULAR SYSTEM: ICD-10-CM

## 2022-06-08 DIAGNOSIS — E55.9 VITAMIN D DEFICIENCY: ICD-10-CM

## 2022-06-08 DIAGNOSIS — I10 HYPERTENSION, UNSPECIFIED TYPE: ICD-10-CM

## 2022-06-08 DIAGNOSIS — G47.33 OBSTRUCTIVE SLEEP APNEA (ADULT) (PEDIATRIC): ICD-10-CM

## 2022-06-08 DIAGNOSIS — E78.2 MIXED HYPERLIPIDEMIA: Primary | ICD-10-CM

## 2022-06-08 LAB
ALBUMIN SERPL-MCNC: 3.8 GM/DL (ref 3.4–4.8)
ALBUMIN/GLOB SERPL: 1 RATIO (ref 1.1–2)
ALP SERPL-CCNC: 64 UNIT/L (ref 40–150)
ALT SERPL-CCNC: 16 UNIT/L (ref 0–55)
AST SERPL-CCNC: 15 UNIT/L (ref 5–34)
BASOPHILS # BLD AUTO: 0.03 X10(3)/MCL (ref 0–0.2)
BASOPHILS NFR BLD AUTO: 0.4 %
BILIRUBIN DIRECT+TOT PNL SERPL-MCNC: 0.4 MG/DL
BUN SERPL-MCNC: 13 MG/DL (ref 8.4–25.7)
CALCIUM SERPL-MCNC: 9.2 MG/DL (ref 8.8–10)
CHLORIDE SERPL-SCNC: 108 MMOL/L (ref 98–107)
CHOLEST SERPL-MCNC: 236 MG/DL
CHOLEST/HDLC SERPL: 5 {RATIO} (ref 0–5)
CO2 SERPL-SCNC: 26 MMOL/L (ref 23–31)
CREAT SERPL-MCNC: 1.01 MG/DL (ref 0.73–1.18)
DEPRECATED CALCIDIOL+CALCIFEROL SERPL-MC: 41.1 NG/ML (ref 30–80)
EOSINOPHIL # BLD AUTO: 0.12 X10(3)/MCL (ref 0–0.9)
EOSINOPHIL NFR BLD AUTO: 1.7 %
ERYTHROCYTE [DISTWIDTH] IN BLOOD BY AUTOMATED COUNT: 14 % (ref 11.5–17)
GLOBULIN SER-MCNC: 3.8 GM/DL (ref 2.4–3.5)
GLUCOSE SERPL-MCNC: 100 MG/DL (ref 82–115)
HCT VFR BLD AUTO: 42.9 % (ref 42–52)
HDLC SERPL-MCNC: 49 MG/DL (ref 35–60)
HGB BLD-MCNC: 13.1 GM/DL (ref 14–18)
IMM GRANULOCYTES # BLD AUTO: 0.02 X10(3)/MCL (ref 0–0.02)
IMM GRANULOCYTES NFR BLD AUTO: 0.3 % (ref 0–0.43)
LDLC SERPL CALC-MCNC: 158 MG/DL (ref 50–140)
LYMPHOCYTES # BLD AUTO: 2.1 X10(3)/MCL (ref 0.6–4.6)
LYMPHOCYTES NFR BLD AUTO: 29.4 %
MCH RBC QN AUTO: 26.7 PG (ref 27–31)
MCHC RBC AUTO-ENTMCNC: 30.5 MG/DL (ref 33–36)
MCV RBC AUTO: 87.4 FL (ref 80–94)
MONOCYTES # BLD AUTO: 0.58 X10(3)/MCL (ref 0.1–1.3)
MONOCYTES NFR BLD AUTO: 8.1 %
NEUTROPHILS # BLD AUTO: 4.3 X10(3)/MCL (ref 2.1–9.2)
NEUTROPHILS NFR BLD AUTO: 60.1 %
NRBC BLD AUTO-RTO: 0 %
PLATELET # BLD AUTO: 400 X10(3)/MCL (ref 130–400)
PMV BLD AUTO: 9.5 FL (ref 9.4–12.4)
POTASSIUM SERPL-SCNC: 5.2 MMOL/L (ref 3.5–5.1)
PROT SERPL-MCNC: 7.6 GM/DL (ref 5.8–7.6)
PSA SERPL-MCNC: 0.92 NG/ML
RBC # BLD AUTO: 4.91 X10(6)/MCL (ref 4.7–6.1)
SODIUM SERPL-SCNC: 144 MMOL/L (ref 136–145)
TRIGL SERPL-MCNC: 147 MG/DL (ref 34–140)
TSH SERPL-ACNC: 2.03 UIU/ML (ref 0.35–4.94)
VLDLC SERPL CALC-MCNC: 29 MG/DL
WBC # SPEC AUTO: 7.1 X10(3)/MCL (ref 4.5–11.5)

## 2022-06-08 PROCEDURE — 85025 COMPLETE CBC W/AUTO DIFF WBC: CPT

## 2022-06-08 PROCEDURE — 84443 ASSAY THYROID STIM HORMONE: CPT

## 2022-06-08 PROCEDURE — 80053 COMPREHEN METABOLIC PANEL: CPT

## 2022-06-08 PROCEDURE — 80061 LIPID PANEL: CPT

## 2022-06-08 PROCEDURE — 84153 ASSAY OF PSA TOTAL: CPT

## 2022-06-08 PROCEDURE — 82306 VITAMIN D 25 HYDROXY: CPT

## 2022-06-08 PROCEDURE — 36415 COLL VENOUS BLD VENIPUNCTURE: CPT

## 2024-05-15 DIAGNOSIS — Z98.890 S/P ARTHROSCOPY OF RIGHT SHOULDER: Primary | ICD-10-CM

## 2024-05-16 NOTE — PROGRESS NOTES
[facility]   Physical Therapy Initial Evaluation     Date: 5/17/2024   Name: Abran Vasquez  Clinic Number: [id number]    Therapy Diagnosis:   Encounter Diagnoses   Name Primary?    Chronic right shoulder pain Yes    Decreased ROM of right shoulder     Impaired functional mobility and activity tolerance      Physician: Wander Sidhu DO    Physician Orders: PT Eval and Treat  Medical Diagnosis from Referral: Right shoulder pain  Evaluation Date: 5/17/2024  Authorization Period Expiration: ***  Plan of Care Expiration: 8/16/2024  Visit # / Visits authorized: 0/ ***    Time In: 12***  Time Out: 13***  Total Billable Time: *** minutes    Surgery: ***s/p arthroscopy of right shoulder*** right ***TSA  Orthopedic Precautions: ***  Pertinent History: Hx left BRANDON    SUBJECTIVE     History of current condition - Abran reports: ***    Medical History:   No past medical history on file.    Surgical History:   Abran Vasquez  has no past surgical history on file.    Medications:   Abran has a current medication list which includes the following prescription(s): amiodarone.    Allergies:   Review of patient's allergies indicates:  Not on File       CMS Impairment/Limitation/Restriction for FOTO Survey  Therapist reviewed FOTO scores for Abran Vasquez on 5/17/2024. FOTO documents entered into [employer] - see Media section.  FOTO filled out by: Patient  Patient's Physical FS Primary Measure: ***  Risk Adjusted Statistical FOTO: ***  Limitation Score: ***%  Category: Carrying  DASH: ***% disability    OBJECTIVE        Posture    {MISC; OT POSTURE:9049991343}     Palpation    Right - tender to *** palpatory pressure *** {Shoulder joint tender:40631}    Left - tender to *** palpatory pressure ***       Dermatomes    Right - ***    Left - ***     Reflexes    Clonus: ***  Mcneil: ***    Right - biceps, brachioradialis, and triceps  Left - biceps, brachioradialis, and triceps     AROM    Flexion  "***   Extension ***  Scaption ***  Abduction ***  Adduction ***  Elbow flx ***  Elbow ext ***  Pronation ***  Supination ***  Wrist flx ***  Wrist ext ***      Pain with ***     PROM      Flexion ***   Extension ***  Scaption ***  Abduction ***  Adduction ***  Elbow flx ***  Elbow ext ***  Pronation ***  Supination ***  Wrist flx ***  Wrist ext ***    Pain with ***     MMT    Right  Shoulder: flx, ext, abd, add, IR, ER  Elbow: flx, ext    Left  Shoulder: flx, ext, abd, add, IR, ER  Elbow: flx, ext               TREATMENT     Abran received the treatments listed below:       Time Activities   Manual     TherAct     TherEx     Gait     Neuro Re-ed     Modalities     E-Stim     Dry Needling     Canalith Repositioning         Home Exercises and Patient Education Provided    Education provided:   -Plan of care, HEP, ***    Written Home Exercises Provided: {Blank single:79882::"yes","Patient instructed to cont prior HEP"}.  Exercises were reviewed and Abran was able to demonstrate them prior to the end of the session.  Abran demonstrated {Desc; good/fair/poor:91747} understanding of the education provided.     Copy of exercises provided placed in paper chart.    ASSESSMENT     Abran is a 64 y.o. male referred to outpatient Physical Therapy with a medical diagnosis of right shoulder pain. Patient presents with ***. Patient will benefit from skilled outpatient Physical Therapy to address the deficits stated above and in the chart below, provide education, and to maximize patient's level of independence.    Patient prognosis is {REHAB PROGNOSIS OHS:54515}.     Plan of care discussed with patient: Yes  Patient's spiritual, cultural and educational needs considered and patient is agreeable to the plan of care and goals as stated below:    Anticipated Barriers for therapy: ***    Goals:  Short Term Goals: 6 weeks   Patient will report at least 10% increase in functional capacity from initial QuickDash score to indicate " clinically significant functional improvement  Patient will report at least 10 point increase on initial FOTO score to indicate clinically significant functional improvement  Patient will ***      Long Term Goals: 12 weeks   Patient will report at least 20% increase in functional capacity from initial QuickDash score to indicate clinically significant functional improvement  Patient will report at least 20 point increase on initial FOTO score to indicate clinically significant functional improvement  Patient will ***      PLAN   Plan of care Certification: 5/17/2024 to 8/16/2024.    Outpatient Physical Therapy 2-3 times weekly for 12 weeks to include the following interventions: Manual Therapy, Moist Heat/ Ice, Neuromuscular Re-ed, Patient Education, Self Care, Therapeutic Activities, and Therapeutic Exercise.     Fior Valdivia, PT

## 2024-05-17 ENCOUNTER — CLINICAL SUPPORT (OUTPATIENT)
Dept: REHABILITATION | Facility: HOSPITAL | Age: 64
End: 2024-05-17
Payer: MEDICARE

## 2024-05-17 DIAGNOSIS — Z74.09 IMPAIRED FUNCTIONAL MOBILITY AND ACTIVITY TOLERANCE: ICD-10-CM

## 2024-05-17 DIAGNOSIS — M25.611 DECREASED ROM OF RIGHT SHOULDER: ICD-10-CM

## 2024-05-17 DIAGNOSIS — G89.29 CHRONIC RIGHT SHOULDER PAIN: Primary | ICD-10-CM

## 2024-05-17 DIAGNOSIS — M25.511 CHRONIC RIGHT SHOULDER PAIN: Primary | ICD-10-CM

## 2024-05-17 PROCEDURE — 97110 THERAPEUTIC EXERCISES: CPT

## 2024-05-17 PROCEDURE — 97162 PT EVAL MOD COMPLEX 30 MIN: CPT

## 2024-05-17 NOTE — PLAN OF CARE
[facility]   Physical Therapy Initial Evaluation     Date: 5/17/2024   Name: Abran Vasquez  Clinic Number: [id number]    Therapy Diagnosis:   Encounter Diagnoses   Name Primary?    Chronic right shoulder pain Yes    Decreased ROM of right shoulder     Impaired functional mobility and activity tolerance      Physician: Wander Sidhu DO    Physician Orders: PT Eval and Treat  Medical Diagnosis from Referral: Right shoulder pain  Evaluation Date: 5/17/2024  Authorization Period Expiration: None  Plan of Care Expiration: 8/16/2024  Visit # / Visits authorized: 0/ 24    Time In: 1251  Time Out: 1315  Total Billable Time: 24 minutes    Surgery: s/p right rTSA 3/25/24  Orthopedic Precautions: None  Pertinent History: Hx left BRANDON    SUBJECTIVE     History of current condition - Abran reports: He finished home health PT and is now coming to OPPT to help maximize his functional right shoulder use. States everything is going very well. Doing HEP 3-4 times per day. No pain in right shoulder.    Medical History:   No past medical history on file.    Surgical History:   Abran Vasquez  has no past surgical history on file.    Medications:   Abran has a current medication list which includes the following prescription(s): amiodarone.    Allergies:   Review of patient's allergies indicates:  Not on File       CMS Impairment/Limitation/Restriction for FOTO Survey  Therapist reviewed FOTO scores for Abran Vasquez on 5/17/2024. FOTO documents entered into [employer] - see Media section.  FOTO filled out by: Patient  Patient's Physical FS Primary Measure: 59 (predicted 74 by visit #17)  Risk Adjusted Statistical FOTO: 42  Limitation Score: 41%  Category: Carrying  DASH: 25% disability    OBJECTIVE        Posture    Good; mild rounded shoulders     Palpation    Negative tenderness       ROM    Passive flexion full range right shoulder, ER to ~60 degrees in neutral plane    Active-assisted flexion  to ~170 deg     MMT    Right  Bicep 5, tricep 5, IR 5, ER 3             TREATMENT     Abran received the treatments listed below:       Time Activities   Manual     TherAct     TherEx 10 min HEP   Gait     Neuro Re-ed     Modalities     E-Stim     Dry Needling     Canalith Repositioning         Home Exercises and Patient Education Provided    Education provided:   -Plan of care, HEP, shoulder precautions    Written Home Exercises Provided: yes.  Exercises were reviewed and Abran was able to demonstrate them prior to the end of the session.  Abran demonstrated good  understanding of the education provided. Yellow and green theraband given 5/17/24.    Copy of exercises provided placed in paper chart.    ASSESSMENT     Abran is a 64 y.o. male referred to outpatient Physical Therapy with a medical diagnosis of right shoulder pain. Patient presents with functional weakness and debility. Patient will benefit from skilled outpatient Physical Therapy to address the deficits stated above and in the chart below, provide education, and to maximize patient's level of independence.    Patient prognosis is Excellent.     Plan of care discussed with patient: Yes  Patient's spiritual, cultural and educational needs considered and patient is agreeable to the plan of care and goals as stated below:    Anticipated Barriers for therapy: None    Goals:  12 weeks   Patient will report at least 10% increase in functional capacity from initial QuickDash score to indicate clinically significant functional improvement  Patient will report at least 15 point increase on initial FOTO score to indicate clinically significant functional improvement      PLAN   Plan of care Certification: 5/17/2024 to 8/16/2024.    Outpatient Physical Therapy 2-3 times weekly for 12 weeks to include the following interventions: Manual Therapy, Moist Heat/ Ice, Neuromuscular Re-ed, Patient Education, Self Care, Therapeutic Activities, and Therapeutic Exercise.      Fior Valdivia, PT

## 2024-05-20 ENCOUNTER — CLINICAL SUPPORT (OUTPATIENT)
Dept: REHABILITATION | Facility: HOSPITAL | Age: 64
End: 2024-05-20
Payer: MEDICARE

## 2024-05-20 DIAGNOSIS — M25.511 CHRONIC RIGHT SHOULDER PAIN: Primary | ICD-10-CM

## 2024-05-20 DIAGNOSIS — G89.29 CHRONIC RIGHT SHOULDER PAIN: Primary | ICD-10-CM

## 2024-05-20 DIAGNOSIS — Z74.09 IMPAIRED FUNCTIONAL MOBILITY AND ACTIVITY TOLERANCE: ICD-10-CM

## 2024-05-20 PROCEDURE — 97110 THERAPEUTIC EXERCISES: CPT

## 2024-05-20 NOTE — PROGRESS NOTES
Physical Therapy Treatment Note     Name: Abran Vasquez  Clinic Number: [id number]    Therapy Diagnosis:   Encounter Diagnoses   Name Primary?    Chronic right shoulder pain Yes    Impaired functional mobility and activity tolerance      Physician: Wander Sidhu DO    Visit Date: 5/20/2024    Physician Orders: PT Eval and Treat  Medical Diagnosis from Referral: Right shoulder pain  Evaluation Date: 5/17/2024  Authorization Period Expiration: None  Plan of Care Expiration: 8/16/2024  Visit # / Visits authorized: 1/ 24     Time In: 0758  Time Out: 0830  Total Billable Time: 32 minutes     Surgery: s/p right rTSA 3/25/24  Orthopedic Precautions: None  Pertinent History: Hx left BRANDON    Subjective     Patient reports: Soreness in right scapular area after HEP, but no pain.    CMS Impairment/Limitation/Restriction for FOTO Survey  Therapist reviewed FOTO scores for Abran Vasquez on 5/17/2024. FOTO documents entered into [employer] - see Media section.  FOTO filled out by: Patient  Patient's Physical FS Primary Measure: 59 (predicted 74 by visit #17)  Risk Adjusted Statistical FOTO: 42  Limitation Score: 41%  Category: Carrying  DASH: 25% disability    Objective     Abran received the following treatment:     Time Activities   Manual     TherAct     TherEx 32 min NuStep    Band shoulder (add, IR), bicep curl, shoulder raise (flexion), supine swiss ball (chop, flexion end range with horz add squeeze, serratus plus), side lying ER        Gait     Neuro Re-ed           Home Exercises Provided and Patient Education Provided     Education provided:   -Plan of care, HEP    Assessment     Post session soreness in right scapula eliminated. He is doing very well in regards to AROM and strength right shoulder. Progress him as tolerated.    Patient prognosis is Excellent.      Anticipated barriers to physical therapy: None    Goals: Abran is working towards his goals.  12 weeks   Patient will report at least 10% increase in  functional capacity from initial QuickDash score to indicate clinically significant functional improvement  Patient will report at least 15 point increase on initial FOTO score to indicate clinically significant functional improvement    Plan     2-3x/week x 12 weeks    Fior Valdivia, PT

## 2024-05-21 NOTE — PROGRESS NOTES
Physical Therapy Treatment Note     Name: Abran Vasquez  Clinic Number: [id number]    Therapy Diagnosis:   Encounter Diagnoses   Name Primary?    Chronic right shoulder pain Yes    Impaired functional mobility and activity tolerance      Physician: Wander Sidhu DO    Visit Date: 2024    Physician Orders: PT Eval and Treat  Medical Diagnosis from Referral: Right shoulder pain  Evaluation Date: 2024  Authorization Period Expiration: None  Plan of Care Expiration: 2024  Visit # / Visits authorized:      Time In: 0757  Time Out: 0853  Total Billable Time: 56 minutes     Surgery: s/p right rTSA 3/25/24  Orthopedic Precautions: None  Pertinent History: Hx left BRANDON    Subjective     Patient reports: Right shoulder feels really good. Having some soreness in right biceps belly. Continues HEP.    CMS Impairment/Limitation/Restriction for FOTO Survey  Therapist reviewed FOTO scores for Abran Vasquez on 2024. FOTO documents entered into [employer] - see Media section.  FOTO filled out by: Patient  Patient's Physical FS Primary Measure: 59 (predicted 74 by visit #17)  Risk Adjusted Statistical FOTO: 42  Limitation Score: 41%  Category: Carrying  DASH: 25% disability    Objective     Abran received the following treatment:    Informed consent obtained after thorough explanation of risks and benefits. Signed consent form will be scanned into medical record. Pre-procedure time out performed which included verification of name, , and site of treatment. 70% isopropyl alcohol wipe down performed to treatment site with single use sterile prep pads.       Time Activities   Manual 23 min Intra-muscular pulsed stimulation using JAIRON ES-130 unit and TDN (see below):    TDN done to right using Seirin 0.30 needles (40mm, 60mm). 40mm needle to brachioradialis, 60mm needle to biceps. Stim at 3Hz low frequency and 5 intensity for 10 min. Done in sitting position.      DTM right scapula, Biofreeze to right arm  (biceps, brachioradialis)       TherAct     TherEx 33 min NuStep    Band shoulder (add, IR), bicep curl, shoulder raise (flexion), supine swiss ball (chop, flexion end range with horz add squeeze, serratus plus), side lying ER        Gait     Neuro Re-ed           Home Exercises Provided and Patient Education Provided     Education provided:   -Plan of care, HEP    Assessment     He is doing very well in regards to AROM and strength right shoulder. Strength is gradually increasing, especially noted improvement in gravity-dependent activity. Progress him as tolerated.    Patient prognosis is Excellent.      Anticipated barriers to physical therapy: None    Goals: Abran is working towards his goals.  12 weeks   Patient will report at least 10% increase in functional capacity from initial QuickDash score to indicate clinically significant functional improvement  Patient will report at least 15 point increase on initial FOTO score to indicate clinically significant functional improvement    Plan     2-3x/week x 12 weeks    Fior Valdivia, PT

## 2024-05-22 ENCOUNTER — CLINICAL SUPPORT (OUTPATIENT)
Dept: REHABILITATION | Facility: HOSPITAL | Age: 64
End: 2024-05-22
Payer: MEDICARE

## 2024-05-22 DIAGNOSIS — M25.511 CHRONIC RIGHT SHOULDER PAIN: Primary | ICD-10-CM

## 2024-05-22 DIAGNOSIS — Z74.09 IMPAIRED FUNCTIONAL MOBILITY AND ACTIVITY TOLERANCE: ICD-10-CM

## 2024-05-22 DIAGNOSIS — G89.29 CHRONIC RIGHT SHOULDER PAIN: Primary | ICD-10-CM

## 2024-05-22 PROCEDURE — 97140 MANUAL THERAPY 1/> REGIONS: CPT

## 2024-05-22 PROCEDURE — 97110 THERAPEUTIC EXERCISES: CPT

## 2024-05-25 NOTE — PROGRESS NOTES
Physical Therapy Treatment Note     Name: Abran Vasquez  Clinic Number: [id number]    Therapy Diagnosis:   Encounter Diagnoses   Name Primary?    Chronic right shoulder pain Yes    Impaired functional mobility and activity tolerance      Physician: Wander Sidhu DO    Visit Date: 5/27/2024    Physician Orders: PT Eval and Treat  Medical Diagnosis from Referral: Right shoulder pain  Evaluation Date: 5/17/2024  Authorization Period Expiration: None  Plan of Care Expiration: 8/16/2024  Visit # / Visits authorized: 3/ 24     Time In: 0737  Time Out: 0815  Total Billable Time: 38 minutes     Surgery: s/p right rTSA 3/25/24  Orthopedic Precautions: None  Pertinent History: Hx left BRANDON    Subjective     Patient reports: Significant improvement in biceps soreness, states dry needling helped tremendously to reduce. Right shoulder continues to feel really good. Continues HEP.    CMS Impairment/Limitation/Restriction for FOTO Survey  Therapist reviewed FOTO scores for Abran Vasquez on 5/17/2024. FOTO documents entered into [employer] - see Media section.  FOTO filled out by: Patient  Patient's Physical FS Primary Measure: 59 (predicted 74 by visit #17)  Risk Adjusted Statistical FOTO: 42  Limitation Score: 41%  Category: Carrying  DASH: 25% disability    Objective     Abran received the following treatment:       Time Activities   Manual  DTM right scapula, Biofreeze to right arm (biceps, brachioradialis)       TherAct     TherEx 38 min NuStep    -Cone stack (across board, not stacked on top of each other)  -Band shoulder (add, IR, ext 45 to 0, ext 45 to 20)  -Bicep curl  -Shoulder raise (flexion)  -Serratus wall flexion (with band)  -Codman's  -Wall wipes  -Shoulder press (scapular plane)  -Bent row        Gait     Neuro Re-ed           Home Exercises Provided and Patient Education Provided     Education provided:   -Plan of care, HEP    Assessment     He is doing very well in regards to AROM and strength right  shoulder. Strength is gradually increasing, especially noted improvement in gravity-dependent activity. Progress him as tolerated.    Patient prognosis is Excellent.      Anticipated barriers to physical therapy: None    Goals: Abran is working towards his goals.  12 weeks   Patient will report at least 10% increase in functional capacity from initial QuickDash score to indicate clinically significant functional improvement  Patient will report at least 15 point increase on initial FOTO score to indicate clinically significant functional improvement    Plan     2-3x/week x 12 weeks    Fior Valdivia, PT

## 2024-05-27 ENCOUNTER — CLINICAL SUPPORT (OUTPATIENT)
Dept: REHABILITATION | Facility: HOSPITAL | Age: 64
End: 2024-05-27
Payer: MEDICARE

## 2024-05-27 DIAGNOSIS — Z74.09 IMPAIRED FUNCTIONAL MOBILITY AND ACTIVITY TOLERANCE: ICD-10-CM

## 2024-05-27 DIAGNOSIS — G89.29 CHRONIC RIGHT SHOULDER PAIN: Primary | ICD-10-CM

## 2024-05-27 DIAGNOSIS — M25.511 CHRONIC RIGHT SHOULDER PAIN: Primary | ICD-10-CM

## 2024-05-27 PROCEDURE — 97110 THERAPEUTIC EXERCISES: CPT

## 2024-05-29 ENCOUNTER — CLINICAL SUPPORT (OUTPATIENT)
Dept: REHABILITATION | Facility: HOSPITAL | Age: 64
End: 2024-05-29
Payer: MEDICARE

## 2024-05-29 DIAGNOSIS — G89.29 CHRONIC RIGHT SHOULDER PAIN: Primary | ICD-10-CM

## 2024-05-29 DIAGNOSIS — M25.511 CHRONIC RIGHT SHOULDER PAIN: Primary | ICD-10-CM

## 2024-05-29 DIAGNOSIS — Z74.09 IMPAIRED FUNCTIONAL MOBILITY AND ACTIVITY TOLERANCE: ICD-10-CM

## 2024-05-29 PROCEDURE — 97110 THERAPEUTIC EXERCISES: CPT

## 2024-05-29 NOTE — PROGRESS NOTES
Physical Therapy Treatment Note     Name: Abran Vasquez  Clinic Number: [id number]    Therapy Diagnosis:   Encounter Diagnoses   Name Primary?    Chronic right shoulder pain Yes    Impaired functional mobility and activity tolerance      Physician: Wander Sidhu DO    Visit Date: 5/29/2024    Physician Orders: PT Eval and Treat  Medical Diagnosis from Referral: Right shoulder pain  Evaluation Date: 5/17/2024  Authorization Period Expiration: None  Plan of Care Expiration: 8/16/2024  Visit # / Visits authorized: 4/ 24     Time In: 0739  Time Out: 0811  Total Billable Time: 32 minutes     Surgery: s/p right rTSA 3/25/24  Orthopedic Precautions: None  Pertinent History: Hx left BRANDON    Subjective     Patient reports: His right shoulder feels really good. He is noticing the strength improvements. No adverse reports.    CMS Impairment/Limitation/Restriction for FOTO Survey  Therapist reviewed FOTO scores for Abran Vasquez on 5/17/2024. FOTO documents entered into [employer] - see Media section.  FOTO filled out by: Patient  Patient's Physical FS Primary Measure: 59 (predicted 74 by visit #17)  Risk Adjusted Statistical FOTO: 42  Limitation Score: 41%  Category: Carrying  DASH: 25% disability    Objective     Abran received the following treatment:       Time Activities   Manual  DTM right scapula, Biofreeze to right arm (biceps, brachioradialis)       TherAct     TherEx 32 min NuStep    -Cone stack (across board, not stacked on top of each other)  -Band shoulder (add, IR, ext 45 to 0)  -Bicep curl  -Hammer curl  -Shoulder raise (flexion)  -Serratus wall flexion (with band)  -Codman's  -Wall wipes  -Shoulder press (scapular plane)  -Bent row        Gait     Neuro Re-ed           Home Exercises Provided and Patient Education Provided     Education provided:   -Plan of care, HEP    Assessment     He is doing very well overall. Strength is gradually increasing, however noted difficulty with overhead motion when light  resistance added. Progress him as tolerated.    Patient prognosis is Excellent.      Anticipated barriers to physical therapy: None    Goals: Abran is working towards his goals.  12 weeks   Patient will report at least 10% increase in functional capacity from initial QuickDash score to indicate clinically significant functional improvement  Patient will report at least 15 point increase on initial FOTO score to indicate clinically significant functional improvement    Plan     2-3x/week x 12 weeks    Fior Valdivia, PT

## 2024-06-03 ENCOUNTER — CLINICAL SUPPORT (OUTPATIENT)
Dept: REHABILITATION | Facility: HOSPITAL | Age: 64
End: 2024-06-03
Payer: MEDICARE

## 2024-06-03 DIAGNOSIS — G89.29 CHRONIC RIGHT SHOULDER PAIN: Primary | ICD-10-CM

## 2024-06-03 DIAGNOSIS — M25.511 CHRONIC RIGHT SHOULDER PAIN: Primary | ICD-10-CM

## 2024-06-03 DIAGNOSIS — Z74.09 IMPAIRED FUNCTIONAL MOBILITY AND ACTIVITY TOLERANCE: ICD-10-CM

## 2024-06-03 PROCEDURE — 97110 THERAPEUTIC EXERCISES: CPT

## 2024-06-03 NOTE — PROGRESS NOTES
Physical Therapy Treatment Note     Name: Abran Vasquez  Clinic Number: [id number]    Therapy Diagnosis:   Encounter Diagnoses   Name Primary?    Chronic right shoulder pain Yes    Impaired functional mobility and activity tolerance      Physician: Wander Sidhu DO    Visit Date: 6/3/2024    Physician Orders: PT Eval and Treat  Medical Diagnosis from Referral: Right shoulder pain  Evaluation Date: 5/17/2024  Authorization Period Expiration: None  Plan of Care Expiration: 8/16/2024  Visit # / Visits authorized: 5/ 24     Time In: 0730  Time Out: 0810  Total Billable Time: 40 minutes     Surgery: s/p right rTSA 3/25/24  Orthopedic Precautions: None  Pertinent History: Hx left BRANDON    Subjective     Patient reports: No adverse reports.    CMS Impairment/Limitation/Restriction for FOTO Survey  Therapist reviewed FOTO scores for Abran Vasquez on 5/17/2024. FOTO documents entered into [employer] - see Media section.  FOTO filled out by: Patient  Patient's Physical FS Primary Measure: 59 (predicted 74 by visit #17)  Risk Adjusted Statistical FOTO: 42  Limitation Score: 41%  Category: Carrying  DASH: 25% disability    Objective     Abran received the following treatment:       Time Activities   Manual  DTM right scapula, Biofreeze to right arm (biceps, brachioradialis)       TherAct     TherEx 40 min NuStep    -Cone stack (across board, not stacked on top of each other)  -Band shoulder (add, IR, ext 45 to 0)  -Bicep curl  -Hammer curl  -Shoulder raise (flexion)  -Serratus wall flexion (with band)  -Codman's  -Wall wipes  -Shoulder press (scapular plane)  -Bent row        Gait     Neuro Re-ed           Home Exercises Provided and Patient Education Provided     Education provided:   -Plan of care, HEP    Assessment     He is doing very well overall. Strength is gradually increasing, however noted difficulty with overhead motion when light resistance added. Progress him as tolerated.    Patient prognosis is Excellent.       Anticipated barriers to physical therapy: None    Goals: Abran is working towards his goals.  12 weeks   Patient will report at least 10% increase in functional capacity from initial QuickDash score to indicate clinically significant functional improvement  Patient will report at least 15 point increase on initial FOTO score to indicate clinically significant functional improvement    Plan     2-3x/week x 12 weeks    Fior Valdivia, PT

## 2024-06-05 ENCOUNTER — CLINICAL SUPPORT (OUTPATIENT)
Dept: REHABILITATION | Facility: HOSPITAL | Age: 64
End: 2024-06-05
Payer: MEDICARE

## 2024-06-05 DIAGNOSIS — Z74.09 IMPAIRED FUNCTIONAL MOBILITY AND ACTIVITY TOLERANCE: ICD-10-CM

## 2024-06-05 DIAGNOSIS — G89.29 CHRONIC RIGHT SHOULDER PAIN: Primary | ICD-10-CM

## 2024-06-05 DIAGNOSIS — M25.511 CHRONIC RIGHT SHOULDER PAIN: Primary | ICD-10-CM

## 2024-06-05 PROCEDURE — 97110 THERAPEUTIC EXERCISES: CPT

## 2024-06-05 NOTE — PROGRESS NOTES
Physical Therapy Treatment Note     Name: Abran Vasquez  Clinic Number: [id number]    Therapy Diagnosis:   Encounter Diagnoses   Name Primary?    Chronic right shoulder pain Yes    Impaired functional mobility and activity tolerance      Physician: Wander Sidhu DO    Visit Date: 6/5/2024    Physician Orders: PT Eval and Treat  Medical Diagnosis from Referral: Right shoulder pain  Evaluation Date: 5/17/2024  Authorization Period Expiration: None  Plan of Care Expiration: 8/16/2024  Visit # / Visits authorized: 6/ 24     Time In: 0744  Time Out: 0815  Total Billable Time: 31 minutes     Surgery: s/p right rTSA 3/25/24  Orthopedic Precautions: None  Pertinent History: Hx left BRANDON    Subjective     Patient reports: No adverse reports.    CMS Impairment/Limitation/Restriction for FOTO Survey  Therapist reviewed FOTO scores for Abran Vasquez on 5/17/2024. FOTO documents entered into [employer] - see Media section.  FOTO filled out by: Patient  Patient's Physical FS Primary Measure: 59 (predicted 73 by visit #17)  Risk Adjusted Statistical FOTO: 42  Limitation Score: 41%  Category: Carrying  DASH: 25% disability      Therapist reviewed FOTO scores for Abran Vasquez on 6/5/2024. FOTO documents entered into [employer] - see Media section.  FOTO filled out by: Patient  Patient's Physical FS Primary Measure: 58 (predicted 73 by visit #17)  Risk Adjusted Statistical FOTO: 42  Limitation Score: 42%  Category: Carrying  DASH: 26.7% disability    Objective     Abran received the following treatment:       Time Activities   Manual  DTM right scapula, Biofreeze to right arm (biceps, brachioradialis)       TherAct     TherEx 31 min NuStep    -Cone stack (across board, not stacked on top of each other)  -Shoulder raise (flexion, abduction)  -Serratus wall flexion (with band)  -Wall wipes  -Shoulder press (scapular plane)  -Prone (scaption, ext)        Gait     Neuro Re-ed           Home Exercises Provided and Patient Education  Provided     Education provided:   -Plan of care, HEP    Assessment     He is doing very well overall. Strength is gradually increasing, however noted difficulty with overhead motion when light resistance added. Progress him as tolerated.    Despite his subjective reports of feeling better and having higher functional capacity, his FOTO score reflects a decrease in functional status. This was expected given a few factors: the nature of his surgery and the time it typically takes to see functional improvement, the high baseline level with which he began outpatient PT leaves little room for major improvements in a short time. I anticipate this lowered FOTO score (or no change in score) to be his normal because the major changes noted will likely occur many months after surgery via continued functional activity performance and strengthening.      Patient prognosis is Excellent.      Anticipated barriers to physical therapy: None    Goals: Abran is working towards his goals.  12 weeks   Patient will report at least 10% increase in functional capacity from initial QuickDash score to indicate clinically significant functional improvement  Patient will report at least 15 point increase on initial FOTO score to indicate clinically significant functional improvement    Plan     2-3x/week x 12 weeks    Fior Valdivia, PT

## 2024-06-07 NOTE — PROGRESS NOTES
Physical Therapy Treatment Note     Name: Abran Vasquez  Clinic Number: [id number]    Therapy Diagnosis:   Encounter Diagnoses   Name Primary?    Chronic right shoulder pain Yes    Impaired functional mobility and activity tolerance     Decreased ROM of right shoulder      Physician: Wander Sidhu DO    Visit Date: 6/10/2024    Physician Orders: PT Eval and Treat  Medical Diagnosis from Referral: Right shoulder pain  Evaluation Date: 5/17/2024  Authorization Period Expiration: None  Plan of Care Expiration: 8/16/2024  Visit # / Visits authorized: 7/ 24     Time In: 0737  Time Out: 0807  Total Billable Time: 30 minutes     Surgery: s/p right rTSA 3/25/24  Orthopedic Precautions: None  Pertinent History: Hx left BRANDON    Subjective     Patient reports: No adverse reports. Everything is feeling better, right arm is getting stronger and he is able to do more with greater ease. Able to work in his garden yesterday and didn't need any help or assistance, and he is feeling more confident.    CMS Impairment/Limitation/Restriction for FOTO Survey  Therapist reviewed FOTO scores for Abran Vasquez on 5/17/2024. FOTO documents entered into [employer] - see Media section.  FOTO filled out by: Patient  Patient's Physical FS Primary Measure: 59 (predicted 73 by visit #17)  Risk Adjusted Statistical FOTO: 42  Limitation Score: 41%  Category: Carrying  DASH: 25% disability      Therapist reviewed FOTO scores for Abran Vasquez on 6/5/2024. FOTO documents entered into [employer] - see Media section.  FOTO filled out by: Patient  Patient's Physical FS Primary Measure: 58 (predicted 73 by visit #17)  Risk Adjusted Statistical FOTO: 42  Limitation Score: 42%  Category: Carrying  DASH: 26.7% disability    Objective     Abran received the following treatment:       Time Activities   Manual  DTM right scapula, Biofreeze to right arm (biceps, brachioradialis)       TherAct     TherEx 30 min NuStep    -Cone stack (across board, not  stacked on top of each other)  -Shoulder raise (flexion, abduction)  -Serratus wall flexion (with band)  -Wall wipes  -Shoulder press (scapular plane single arm, sagittal plane double arm)  -Prone (scaption, ext)        Gait     Neuro Re-ed           Home Exercises Provided and Patient Education Provided     Education provided:   -Plan of care, HEP    Assessment     He is doing very well overall. Strength is gradually increasing, however noted difficulty with overhead motion when light resistance added. Progress him as tolerated.    Despite his subjective reports of feeling better and having higher functional capacity, his FOTO score reflects a decrease in functional status. This was expected given a few factors: the nature of his surgery and the time it typically takes to see functional improvement, the high baseline level with which he began outpatient PT leaves little room for major improvements in a short time. I anticipate this lowered FOTO score (or no change in score) to be his normal because the major changes noted will likely occur many months after surgery via continued functional activity performance and strengthening.      Patient prognosis is Excellent.      Anticipated barriers to physical therapy: None    Goals: Abran is working towards his goals.  12 weeks   Patient will report at least 10% increase in functional capacity from initial QuickDash score to indicate clinically significant functional improvement  Patient will report at least 15 point increase on initial FOTO score to indicate clinically significant functional improvement    Plan     2-3x/week x 12 weeks    Fior Valdivia, PT

## 2024-06-10 ENCOUNTER — CLINICAL SUPPORT (OUTPATIENT)
Dept: REHABILITATION | Facility: HOSPITAL | Age: 64
End: 2024-06-10
Payer: MEDICARE

## 2024-06-10 DIAGNOSIS — M25.511 CHRONIC RIGHT SHOULDER PAIN: Primary | ICD-10-CM

## 2024-06-10 DIAGNOSIS — G89.29 CHRONIC RIGHT SHOULDER PAIN: Primary | ICD-10-CM

## 2024-06-10 DIAGNOSIS — M25.611 DECREASED ROM OF RIGHT SHOULDER: ICD-10-CM

## 2024-06-10 DIAGNOSIS — Z74.09 IMPAIRED FUNCTIONAL MOBILITY AND ACTIVITY TOLERANCE: ICD-10-CM

## 2024-06-10 PROCEDURE — 97110 THERAPEUTIC EXERCISES: CPT

## 2024-06-11 NOTE — PROGRESS NOTES
Physical Therapy Treatment Note     Name: Abran Vasquez  Clinic Number: [id number]    Therapy Diagnosis:   Encounter Diagnoses   Name Primary?    Chronic right shoulder pain Yes    Impaired functional mobility and activity tolerance      Physician: Wander Sidhu DO    Visit Date: 6/12/2024    Physician Orders: PT Eval and Treat  Medical Diagnosis from Referral: Right shoulder pain  Evaluation Date: 5/17/2024  Authorization Period Expiration: None  Plan of Care Expiration: 8/16/2024  Visit # / Visits authorized: 8/ 24     Time In: 0734  Time Out: 0808  Total Billable Time: 34 minutes     Surgery: s/p right rTSA 3/25/24  Orthopedic Precautions: None  Pertinent History: Hx left BRANDON    Subjective     Patient reports: No adverse reports. Everything is feeling better, right arm is getting stronger and he is able to do more with greater ease.    CMS Impairment/Limitation/Restriction for FOTO Survey  Therapist reviewed FOTO scores for Abran Vasquez on 5/17/2024. FOTO documents entered into [employer] - see Media section.  FOTO filled out by: Patient  Patient's Physical FS Primary Measure: 59 (predicted 73 by visit #17)  Risk Adjusted Statistical FOTO: 42  Limitation Score: 41%  Category: Carrying  DASH: 25% disability      Therapist reviewed FOTO scores for Abran Vasquez on 6/5/2024. FOTO documents entered into [employer] - see Media section.  FOTO filled out by: Patient  Patient's Physical FS Primary Measure: 58 (predicted 73 by visit #17)  Risk Adjusted Statistical FOTO: 42  Limitation Score: 42%  Category: Carrying  DASH: 26.7% disability    Objective     Abran received the following treatment:       Time Activities   Manual  DTM right scapula, Biofreeze to right arm (biceps, brachioradialis)       TherAct     TherEx 34 min NuStep      Shoulder raise (flexion, abduction), Serratus wall flexion (with band), Wall wipes, codman's, Shoulder press (frontal plane single arm, sagittal plane double arm full ROM and  shimmy), band ext, bicep curl        Gait     Neuro Re-ed           Home Exercises Provided and Patient Education Provided     Education provided:   -Plan of care, HEP    Assessment     He is doing very well overall. Strength is gradually increasing, however noted difficulty with overhead motion when light resistance added. Progress him as tolerated.    Despite his subjective reports of feeling better and having higher functional capacity, his FOTO score reflects a decrease in functional status. This was expected given a few factors: the nature of his surgery and the time it typically takes to see functional improvement, the high baseline level with which he began outpatient PT leaves little room for major improvements in a short time. I anticipate this lowered FOTO score (or no change in score) to be his normal because the major changes noted will likely occur many months after surgery via continued functional activity performance and strengthening.      Patient prognosis is Excellent.      Anticipated barriers to physical therapy: None    Goals: Abran is working towards his goals.  12 weeks   Patient will report at least 10% increase in functional capacity from initial QuickDash score to indicate clinically significant functional improvement  Patient will report at least 15 point increase on initial FOTO score to indicate clinically significant functional improvement    Plan     2-3x/week x 12 weeks    Fior Valdivia, PT

## 2024-06-12 ENCOUNTER — CLINICAL SUPPORT (OUTPATIENT)
Dept: REHABILITATION | Facility: HOSPITAL | Age: 64
End: 2024-06-12
Payer: MEDICARE

## 2024-06-12 DIAGNOSIS — M25.511 CHRONIC RIGHT SHOULDER PAIN: Primary | ICD-10-CM

## 2024-06-12 DIAGNOSIS — G89.29 CHRONIC RIGHT SHOULDER PAIN: Primary | ICD-10-CM

## 2024-06-12 DIAGNOSIS — Z74.09 IMPAIRED FUNCTIONAL MOBILITY AND ACTIVITY TOLERANCE: ICD-10-CM

## 2024-06-12 PROCEDURE — 97110 THERAPEUTIC EXERCISES: CPT

## 2024-06-17 ENCOUNTER — CLINICAL SUPPORT (OUTPATIENT)
Dept: REHABILITATION | Facility: HOSPITAL | Age: 64
End: 2024-06-17
Payer: MEDICARE

## 2024-06-17 DIAGNOSIS — M25.511 CHRONIC RIGHT SHOULDER PAIN: Primary | ICD-10-CM

## 2024-06-17 DIAGNOSIS — G89.29 CHRONIC RIGHT SHOULDER PAIN: Primary | ICD-10-CM

## 2024-06-17 DIAGNOSIS — M25.611 DECREASED ROM OF RIGHT SHOULDER: ICD-10-CM

## 2024-06-17 DIAGNOSIS — Z74.09 IMPAIRED FUNCTIONAL MOBILITY AND ACTIVITY TOLERANCE: ICD-10-CM

## 2024-06-17 PROCEDURE — 97110 THERAPEUTIC EXERCISES: CPT

## 2024-06-17 PROCEDURE — 97140 MANUAL THERAPY 1/> REGIONS: CPT

## 2024-06-17 NOTE — PROGRESS NOTES
Physical Therapy Treatment Note     Name: Abran Vasquez  Clinic Number: [id number]    Therapy Diagnosis:   Encounter Diagnoses   Name Primary?    Chronic right shoulder pain Yes    Impaired functional mobility and activity tolerance     Decreased ROM of right shoulder      Physician: Wander Sidhu DO    Visit Date: 2024    Physician Orders: PT Eval and Treat  Medical Diagnosis from Referral: Right shoulder pain  Evaluation Date: 2024  Authorization Period Expiration: None  Plan of Care Expiration: 2024  Visit # / Visits authorized:      Time In: 730  Time Out: 823  Total Billable Time: 53 minutes     Surgery: s/p right rTSA 3/25/24  Orthopedic Precautions: None  Pertinent History: Hx left BRANDON    Subjective     Patient reports: Able to reach for his wallet in his back pocket without difficulty (using right hand). Right shoulder feels great, but having some soreness in right bicep.    CMS Impairment/Limitation/Restriction for FOTO Survey  Therapist reviewed FOTO scores for Abran Vasquez on 2024. FOTO documents entered into [employer] - see Media section.  FOTO filled out by: Patient  Patient's Physical FS Primary Measure: 59 (predicted 73 by visit #17)  Risk Adjusted Statistical FOTO: 42  Limitation Score: 41%  Category: Carrying  DASH: 25% disability      Therapist reviewed FOTO scores for Abran Vasquez on 2024. FOTO documents entered into [employer] - see Media section.  FOTO filled out by: Patient  Patient's Physical FS Primary Measure: 58 (predicted 73 by visit #17)  Risk Adjusted Statistical FOTO: 42  Limitation Score: 42%  Category: Carrying  DASH: 26.7% disability    Objective     Abran received the following treatment:    Informed consent obtained after thorough explanation of risks and benefits. Signed consent form will be scanned into medical record. Pre-procedure time out performed which included verification of name, , and site of treatment. 70% isopropyl alcohol  wipe down performed to treatment site with single use sterile prep pads.       Time Activities   Manual 15 min Intra-muscular pulsed stimulation using JAIRON ES-130 unit and TDN (see below):    TDN done to right arm using Seirin 0.30 needles (60mm). 2 needles to bicep. Stim at 3Hz low frequency and 4 intensity for 10 min. Done in sitting position with right arm resting on table.    Biofreeze to right bicep       TherAct     TherEx 38 min NuStep      Shoulder raise (flexion, abduction), Serratus wall flexion (with band), Wall wipes, codman's, Shoulder press (frontal plane single arm, sagittal plane double arm full ROM and shimmy), band ext, bicep curl, behind the back shoulder adduction/IR        Gait     Neuro Re-ed           Home Exercises Provided and Patient Education Provided     Education provided:   -Plan of care, HEP    Assessment     He is doing very well overall. Functional ROM is gradually improving, as is functional strength and endurance. Continue to safely progress him as tolerated.    Despite his subjective reports of feeling better and having higher functional capacity, his FOTO score reflects a decrease in functional status. This was expected given a few factors: the nature of his surgery and the time it typically takes to see functional improvement, the high baseline level with which he began outpatient PT leaves little room for major improvements in a short time. I anticipate this lowered FOTO score (or no change in score) to be his normal because the major changes noted will likely occur many months after surgery via continued functional activity performance and strengthening.      Patient prognosis is Excellent.      Anticipated barriers to physical therapy: None    Goals: Abran is working towards his goals.  12 weeks   Patient will report at least 10% increase in functional capacity from initial QuickDash score to indicate clinically significant functional improvement  Patient will report at least  15 point increase on initial FOTO score to indicate clinically significant functional improvement    Plan     2-3x/week x 12 weeks    Fior Valdivia, PT

## 2024-06-19 ENCOUNTER — CLINICAL SUPPORT (OUTPATIENT)
Dept: REHABILITATION | Facility: HOSPITAL | Age: 64
End: 2024-06-19
Payer: MEDICARE

## 2024-06-19 DIAGNOSIS — Z74.09 IMPAIRED FUNCTIONAL MOBILITY AND ACTIVITY TOLERANCE: ICD-10-CM

## 2024-06-19 DIAGNOSIS — M25.511 CHRONIC RIGHT SHOULDER PAIN: Primary | ICD-10-CM

## 2024-06-19 DIAGNOSIS — G89.29 CHRONIC RIGHT SHOULDER PAIN: Primary | ICD-10-CM

## 2024-06-19 PROCEDURE — 97110 THERAPEUTIC EXERCISES: CPT | Mod: KX

## 2024-06-19 NOTE — PROGRESS NOTES
Physical Therapy Treatment Note     Name: Abran Vasquez  Clinic Number: [id number]    Therapy Diagnosis:   Encounter Diagnoses   Name Primary?    Chronic right shoulder pain Yes    Impaired functional mobility and activity tolerance      Physician: Wander Sidhu DO    Visit Date: 6/19/2024    Physician Orders: PT Eval and Treat  Medical Diagnosis from Referral: Right shoulder pain  Evaluation Date: 5/17/2024  Authorization Period Expiration: None  Plan of Care Expiration: 8/16/2024  Visit # / Visits authorized: 10/ 24     Time In: 0725  Time Out: 0754  Total Billable Time: 29 minutes     Surgery: s/p right rTSA 3/25/24  Orthopedic Precautions: None  Pertinent History: Hx left BRANDON    Subjective     Patient reports: tenderness in right bicep. Says it is about the same as before last session. Otherwise his shoulder is feeling good overall.     CMS Impairment/Limitation/Restriction for FOTO Survey  Therapist reviewed FOTO scores for Abran Vasquez on 5/17/2024. FOTO documents entered into [employer] - see Media section.  FOTO filled out by: Patient  Patient's Physical FS Primary Measure: 59 (predicted 73 by visit #17)  Risk Adjusted Statistical FOTO: 42  Limitation Score: 41%  Category: Carrying  DASH: 25% disability    Therapist reviewed FOTO scores for Abran Vasquez on 6/5/2024. FOTO documents entered into [employer] - see Media section.  FOTO filled out by: Patient  Patient's Physical FS Primary Measure: 58 (predicted 73 by visit #17)  Risk Adjusted Statistical FOTO: 42  Limitation Score: 42%  Category: Carrying  DASH: 26.7% disability    Objective     Abran received the following treatment:     Time Activities   Manual     TherAct     TherEx 29 min NuStep    Shoulder raise (flexion, abduction), Serratus wall flexion (with band), Wall wipes, codman's, Shoulder press (frontal plane single arm, sagittal plane double arm full ROM and shimmy), band ext, bicep curl, behind the back shoulder adduction/IR      Gait      Neuro Re-ed       Home Exercises Provided and Patient Education Provided     Education provided:   -Plan of care, HEP    Assessment     He tolerated session well and was able to complete adjusted exercises without increased shoulder tenderness. Held off on bicep curls today due to ongoing tenderness. Functional ROM, functional strength and endurance continue to grasually improve. Will Continue to safely progress activity program as tolerated.    Patient prognosis is Excellent.      Anticipated barriers to physical therapy: None    Goals: Abran is working towards his goals.  12 weeks   Patient will report at least 10% increase in functional capacity from initial QuickDash score to indicate clinically significant functional improvement  Patient will report at least 15 point increase on initial FOTO score to indicate clinically significant functional improvement    Plan     2-3x/week x 12 weeks    Javy Pradhan, PT

## 2024-06-26 ENCOUNTER — CLINICAL SUPPORT (OUTPATIENT)
Dept: REHABILITATION | Facility: HOSPITAL | Age: 64
End: 2024-06-26
Payer: MEDICARE

## 2024-06-26 DIAGNOSIS — G89.29 CHRONIC RIGHT SHOULDER PAIN: Primary | ICD-10-CM

## 2024-06-26 DIAGNOSIS — M25.611 DECREASED ROM OF RIGHT SHOULDER: ICD-10-CM

## 2024-06-26 DIAGNOSIS — M25.511 CHRONIC RIGHT SHOULDER PAIN: Primary | ICD-10-CM

## 2024-06-26 DIAGNOSIS — Z74.09 IMPAIRED FUNCTIONAL MOBILITY AND ACTIVITY TOLERANCE: ICD-10-CM

## 2024-06-26 PROCEDURE — 97110 THERAPEUTIC EXERCISES: CPT

## 2024-06-26 NOTE — PROGRESS NOTES
Physical Therapy Treatment Note     Name: Abran Vasquez  Clinic Number: [id number]    Therapy Diagnosis:   Encounter Diagnoses   Name Primary?    Chronic right shoulder pain Yes    Impaired functional mobility and activity tolerance     Decreased ROM of right shoulder      Physician: Wander Sidhu DO    Visit Date: 2024    Physician Orders: PT Eval and Treat  Medical Diagnosis from Referral: Right shoulder pain  Evaluation Date: 2024  Authorization Period Expiration: None  Plan of Care Expiration: 2024  Visit # / Visits authorized:      Time In: 746  Time Out: 824  Total Billable Time: 38 minutes     Surgery: s/p right rTSA 3/25/24  Orthopedic Precautions: None  Pertinent History: Hx left BRANDON    Subjective     Patient reports: Doing much better today, missed previous session 2/2 heat stroke.    CMS Impairment/Limitation/Restriction for FOTO Survey  Therapist reviewed FOTO scores for Abran Vasquez on 2024. FOTO documents entered into [employer] - see Media section.  FOTO filled out by: Patient  Patient's Physical FS Primary Measure: 59 (predicted 73 by visit #17)  Risk Adjusted Statistical FOTO: 42  Limitation Score: 41%  Category: Carrying  DASH: 25% disability      Therapist reviewed FOTO scores for Abran Vasquez on 2024. FOTO documents entered into [employer] - see Media section.  FOTO filled out by: Patient  Patient's Physical FS Primary Measure: 58 (predicted 73 by visit #17)  Risk Adjusted Statistical FOTO: 42  Limitation Score: 42%  Category: Carrying  DASH: 26.7% disability    Objective     Abran received the following treatment:    Informed consent obtained after thorough explanation of risks and benefits. Signed consent form will be scanned into medical record. Pre-procedure time out performed which included verification of name, , and site of treatment. 70% isopropyl alcohol wipe down performed to treatment site with single use sterile prep pads.       Time  Activities   Manual  Intra-muscular pulsed stimulation using JAIRON ES-130 unit and TDN (see below):    TDN done to right arm using Seirin 0.30 needles (60mm). 2 needles to bicep. Stim at 3Hz low frequency and 4 intensity for 10 min. Done in sitting position with right arm resting on table.    Biofreeze to right bicep       TherAct     TherEx 38 min NuStep      Shoulder raise (flexion, abduction), Serratus wall flexion (with band), Wall wipes, shoulder press (frontal plane single arm, sagittal plane double arm full ROM and shimmy), band ext, bicep curl, behind the back shoulder adduction/IR, scaption end range wall reach        Gait     Neuro Re-ed           Home Exercises Provided and Patient Education Provided     Education provided:   -Plan of care, HEP    Assessment     He is doing very well overall. Functional ROM is gradually improving, as is functional strength and endurance. Continue to safely progress him as tolerated.    Despite his subjective reports of feeling better and having higher functional capacity, his FOTO score reflects a decrease in functional status. This was expected given a few factors: the nature of his surgery and the time it typically takes to see functional improvement, the high baseline level with which he began outpatient PT leaves little room for major improvements in a short time. I anticipate this lowered FOTO score (or no change in score) to be his normal because the major changes noted will likely occur many months after surgery via continued functional activity performance and strengthening.      Patient prognosis is Excellent.      Anticipated barriers to physical therapy: None    Goals: Abran is working towards his goals.  12 weeks   Patient will report at least 10% increase in functional capacity from initial QuickDash score to indicate clinically significant functional improvement  Patient will report at least 15 point increase on initial FOTO score to indicate clinically  significant functional improvement    Plan     2-3x/week x 12 weeks    Fior Valdivia, PT

## 2024-07-01 ENCOUNTER — CLINICAL SUPPORT (OUTPATIENT)
Dept: REHABILITATION | Facility: HOSPITAL | Age: 64
End: 2024-07-01
Payer: MEDICARE

## 2024-07-01 DIAGNOSIS — Z74.09 IMPAIRED FUNCTIONAL MOBILITY AND ACTIVITY TOLERANCE: ICD-10-CM

## 2024-07-01 DIAGNOSIS — M25.511 CHRONIC RIGHT SHOULDER PAIN: Primary | ICD-10-CM

## 2024-07-01 DIAGNOSIS — G89.29 CHRONIC RIGHT SHOULDER PAIN: Primary | ICD-10-CM

## 2024-07-01 PROCEDURE — 97110 THERAPEUTIC EXERCISES: CPT | Mod: KX

## 2024-07-01 NOTE — PROGRESS NOTES
Physical Therapy Treatment Note     Name: Abran Vasquez  Clinic Number: [id number]    Therapy Diagnosis:   Encounter Diagnoses   Name Primary?    Chronic right shoulder pain Yes    Impaired functional mobility and activity tolerance      Physician: Wander Sidhu DO    Visit Date: 7/1/2024    Physician Orders: PT Eval and Treat  Medical Diagnosis from Referral: Right shoulder pain  Evaluation Date: 5/17/2024  Authorization Period Expiration: None  Plan of Care Expiration: 8/16/2024  Visit # / Visits authorized: 12/ 24     Time In: 0752  Time Out: 0830  Total Billable Time: 38 minutes     Surgery: s/p right rTSA 3/25/24  Orthopedic Precautions: None  Pertinent History: Hx left BRANDON    Subjective     Patient reports: Doing much better today. He has more confidence in his functional activity performance, although not at baseline.    CMS Impairment/Limitation/Restriction for FOTO Survey  Therapist reviewed FOTO scores for Abran Vasquez on 5/17/2024. FOTO documents entered into EPIC - see Media section.  FOTO filled out by: Patient  Patient's Physical FS Primary Measure: 59 (predicted 73 by visit #17)  Risk Adjusted Statistical FOTO: 42  Limitation Score: 41%  Category: Carrying  DASH: 25% disability      Therapist reviewed FOTO scores for Abran Vasquez on 6/5/2024. FOTO documents entered into EPIC - see Media section.  FOTO filled out by: Patient  Patient's Physical FS Primary Measure: 58 (predicted 73 by visit #17)  Risk Adjusted Statistical FOTO: 42  Limitation Score: 42%  Category: Carrying  DASH: 26.7% disability      Therapist reviewed FOTO scores for Abran Vasquez on 7/1/2024. FOTO documents entered into EPIC - see Media section.  FOTO filled out by: Patient  Patient's Physical FS Primary Measure: 65 (predicted 73 by visit #17)  Risk Adjusted Statistical FOTO: 42  Limitation Score: 35%  Category: Carrying  DASH: 15.8% disability      Objective     Abran received the following  treatment:    Informed consent obtained after thorough explanation of risks and benefits. Signed consent form will be scanned into medical record. Pre-procedure time out performed which included verification of name, , and site of treatment. 70% isopropyl alcohol wipe down performed to treatment site with single use sterile prep pads.       Time Activities   Manual  Intra-muscular pulsed stimulation using JAIRON ES-130 unit and TDN (see below):    TDN done to right arm using Seirin 0.30 needles (60mm). 2 needles to bicep. Stim at 3Hz low frequency and 4 intensity for 10 min. Done in sitting position with right arm resting on table.    Biofreeze to right bicep       TherAct     TherEx 38 min NuStep      Shoulder raise (flexion, abduction), wall wipes, shoulder press (frontal plane single arm, sagittal plane single arm), band adduction, band lat pulldown, bicep curl, scaption end range wall reach, bent row        Gait     Neuro Re-ed           Home Exercises Provided and Patient Education Provided     Education provided:   -Plan of care, HEP    Assessment     He is doing very well overall. Functional ROM is gradually improving, as is functional strength and endurance. Continue to safely progress him as tolerated. Improved FOTO score indicates functional improvement from his perception. Will likely discharge after 3 additional visits, as he is at the point where he can safely assume independent home program. Most of his remaining functional gain will likely occur over the long term with repeat performance (specific activity) of his functional activities.      Patient prognosis is Excellent.      Anticipated barriers to physical therapy: None    Goals: Abran is working towards his goals.  12 weeks   Patient will report at least 10% increase in functional capacity from initial QuickDash score to indicate clinically significant functional improvement  Patient will report at least 15 point increase on initial FOTO score to  indicate clinically significant functional improvement    Plan     2-3x/week x 12 weeks    Fior Valdivia, PT

## 2024-07-03 ENCOUNTER — CLINICAL SUPPORT (OUTPATIENT)
Dept: REHABILITATION | Facility: HOSPITAL | Age: 64
End: 2024-07-03
Payer: MEDICARE

## 2024-07-03 DIAGNOSIS — M25.611 DECREASED ROM OF RIGHT SHOULDER: ICD-10-CM

## 2024-07-03 DIAGNOSIS — Z74.09 IMPAIRED FUNCTIONAL MOBILITY AND ACTIVITY TOLERANCE: ICD-10-CM

## 2024-07-03 DIAGNOSIS — G89.29 CHRONIC RIGHT SHOULDER PAIN: Primary | ICD-10-CM

## 2024-07-03 DIAGNOSIS — M25.511 CHRONIC RIGHT SHOULDER PAIN: Primary | ICD-10-CM

## 2024-07-03 PROCEDURE — 97110 THERAPEUTIC EXERCISES: CPT

## 2024-07-03 NOTE — PROGRESS NOTES
Physical Therapy Treatment Note     Name: Abran Vasquez  Clinic Number: [id number]    Therapy Diagnosis:   Encounter Diagnoses   Name Primary?    Chronic right shoulder pain Yes    Impaired functional mobility and activity tolerance      Physician: Wander Sidhu DO    Visit Date: 7/8/2024    Physician Orders: PT Eval and Treat  Medical Diagnosis from Referral: Right shoulder pain  Evaluation Date: 5/17/2024  Authorization Period Expiration: None  Plan of Care Expiration: 8/16/2024  Visit # / Visits authorized: 14/ 24     Time In: 0740  Time Out: 0807  Total Billable Time: 27 minutes    Surgery: s/p right rTSA 3/25/24  Orthopedic Precautions: None  Pertinent History: Hx left BRANDON    Subjective     Patient reports: Weather changes from the weekend he noticed pain throughout the joints in his body. Overall his right shoulder feels very strong and he is confident in continuing an independent home program.    CMS Impairment/Limitation/Restriction for FOTO Survey  Therapist reviewed FOTO scores for Abran Vasquez on 5/17/2024. FOTO documents entered into EPIC - see Media section.  FOTO filled out by: Patient  Patient's Physical FS Primary Measure: 59 (predicted 73 by visit #17)  Risk Adjusted Statistical FOTO: 42  Limitation Score: 41%  Category: Carrying  DASH: 25% disability      Therapist reviewed FOTO scores for Abran Vasquez on 6/5/2024. FOTO documents entered into EPIC - see Media section.  FOTO filled out by: Patient  Patient's Physical FS Primary Measure: 58 (predicted 73 by visit #17)  Risk Adjusted Statistical FOTO: 42  Limitation Score: 42%  Category: Carrying  DASH: 26.7% disability      Therapist reviewed FOTO scores for Abran Vasquez on 7/1/2024. FOTO documents entered into EPIC - see Media section.  FOTO filled out by: Patient  Patient's Physical FS Primary Measure: 65 (predicted 73 by visit #17)  Risk Adjusted Statistical FOTO: 42  Limitation Score: 35%  Category: Carrying  DASH:  15.8% disability      Objective     Abran received the following treatment:       Time Activities   Manual  Intra-muscular pulsed stimulation using JAIRON ES-130 unit and TDN (see below):    TDN done to right arm using Seirin 0.30 needles (60mm). 2 needles to bicep. Stim at 3Hz low frequency and 4 intensity for 10 min. Done in sitting position with right arm resting on table.    Biofreeze to right bicep       TherAct     TherEx 27 min NuStep      Shoulder raise (flexion, abduction), wall wipes, shoulder press (frontal plane single arm, sagittal plane single arm), band adduction, band lat pulldown, bicep curl, scaption end range wall reach, bent row, behind the back IR        Gait     Neuro Re-ed       MMT Shoulder and Elbow (Left)  Flexion - 5  Abduction - 5  ER - 5  IR - 5  Ext - 5  Bicep - 5  Tricep - 5    MMT Shoulder and Elbow (Right)  Flexion - 4+  Abduction - 4  ER - 4  IR - 4+  Ext - 5  Bicep - 5  Tricep - 5    Home Exercises Provided and Patient Education Provided     Education provided:   -Plan of care, HEP, therabands (blue and black) given 7/3/24    Assessment     He is doing very well overall. Functional ROM is gradually improving, as is functional strength and endurance. At this point he will likely see more gains over the long-term of continuing his home program and functional/work-related activities (for specificity training). We will discharge to independent program.    Patient prognosis is Excellent.      Anticipated barriers to physical therapy: None    Goals: Abran is working towards his goals.  12 weeks   Patient will report at least 10% increase in functional capacity from initial QuickDash score to indicate clinically significant functional improvement  Patient will report at least 15 point increase on initial FOTO score to indicate clinically significant functional improvement    Plan     Discharge to independent home program    Fior Valdivia, PT

## 2024-07-03 NOTE — PROGRESS NOTES
Physical Therapy Treatment Note     Name: Abran Vasquez  Clinic Number: [id number]    Therapy Diagnosis:   Encounter Diagnoses   Name Primary?    Chronic right shoulder pain Yes    Impaired functional mobility and activity tolerance     Decreased ROM of right shoulder      Physician: Wander Sidhu DO    Visit Date: 7/3/2024    Physician Orders: PT Eval and Treat  Medical Diagnosis from Referral: Right shoulder pain  Evaluation Date: 5/17/2024  Authorization Period Expiration: None  Plan of Care Expiration: 8/16/2024  Visit # / Visits authorized: 13/ 24     Time In: 0744  Time Out: 0823  Total Billable Time: 39 minutes     Surgery: s/p right rTSA 3/25/24  Orthopedic Precautions: None  Pertinent History: Hx left BRANDON    Subjective     Patient reports: He has more confidence in his functional activity performance, although not at baseline.    CMS Impairment/Limitation/Restriction for FOTO Survey  Therapist reviewed FOTO scores for Abran Vasquez on 5/17/2024. FOTO documents entered into EPIC - see Media section.  FOTO filled out by: Patient  Patient's Physical FS Primary Measure: 59 (predicted 73 by visit #17)  Risk Adjusted Statistical FOTO: 42  Limitation Score: 41%  Category: Carrying  DASH: 25% disability      Therapist reviewed FOTO scores for Abran Vasquez on 6/5/2024. FOTO documents entered into EPIC - see Media section.  FOTO filled out by: Patient  Patient's Physical FS Primary Measure: 58 (predicted 73 by visit #17)  Risk Adjusted Statistical FOTO: 42  Limitation Score: 42%  Category: Carrying  DASH: 26.7% disability      Therapist reviewed FOTO scores for Abran Vasquez on 7/1/2024. FOTO documents entered into EPIC - see Media section.  FOTO filled out by: Patient  Patient's Physical FS Primary Measure: 65 (predicted 73 by visit #17)  Risk Adjusted Statistical FOTO: 42  Limitation Score: 35%  Category: Carrying  DASH: 15.8% disability      Objective     Abran received the following  treatment:       Time Activities   Manual  Intra-muscular pulsed stimulation using JAIRON ES-130 unit and TDN (see below):    TDN done to right arm using Seirin 0.30 needles (60mm). 2 needles to bicep. Stim at 3Hz low frequency and 4 intensity for 10 min. Done in sitting position with right arm resting on table.    Biofreeze to right bicep       TherAct     TherEx 39 min NuStep      Shoulder raise (flexion, abduction), wall wipes, shoulder press (frontal plane single arm, sagittal plane single arm), band adduction, band lat pulldown, bicep curl, scaption end range wall reach, bent row, behind the back IR        Gait     Neuro Re-ed       MMT Shoulder and Elbow (Left)  Flexion - 5  Abduction - 5  ER - 5  IR - 5  Ext - 5  Bicep - 5  Tricep - 5    MMT Shoulder and Elbow (Right)  Flexion - 4+  Abduction - 4  ER - 4  IR - 4+  Ext - 5  Bicep - 5  Tricep - 5    Home Exercises Provided and Patient Education Provided     Education provided:   -Plan of care, HEP, therabands (blue and black) given 7/3/24    Assessment     He is doing very well overall. Functional ROM is gradually improving, as is functional strength and endurance. Continue to safely progress him as tolerated. Improved FOTO score indicates functional improvement from his perception. Will likely discharge after 1-2 additional visits, as he is at the point where he can safely assume independent home program. Most of his remaining functional gain will likely occur over the long term with repeat performance (specific activity) of his functional activities.    Patient prognosis is Excellent.      Anticipated barriers to physical therapy: None    Goals: Abran is working towards his goals.  12 weeks   Patient will report at least 10% increase in functional capacity from initial QuickDash score to indicate clinically significant functional improvement  Patient will report at least 15 point increase on initial FOTO score to indicate clinically significant functional  improvement    Plan     2-3x/week x 12 weeks    Fior Valdivia, PT

## 2024-07-08 ENCOUNTER — CLINICAL SUPPORT (OUTPATIENT)
Dept: REHABILITATION | Facility: HOSPITAL | Age: 64
End: 2024-07-08
Payer: MEDICARE

## 2024-07-08 DIAGNOSIS — M25.511 CHRONIC RIGHT SHOULDER PAIN: Primary | ICD-10-CM

## 2024-07-08 DIAGNOSIS — Z74.09 IMPAIRED FUNCTIONAL MOBILITY AND ACTIVITY TOLERANCE: ICD-10-CM

## 2024-07-08 DIAGNOSIS — G89.29 CHRONIC RIGHT SHOULDER PAIN: Primary | ICD-10-CM

## 2024-07-08 PROCEDURE — 97110 THERAPEUTIC EXERCISES: CPT | Mod: KX
